# Patient Record
Sex: FEMALE | Race: WHITE | NOT HISPANIC OR LATINO | Employment: FULL TIME | URBAN - METROPOLITAN AREA
[De-identification: names, ages, dates, MRNs, and addresses within clinical notes are randomized per-mention and may not be internally consistent; named-entity substitution may affect disease eponyms.]

---

## 2020-11-16 RX ORDER — PROCHLORPERAZINE 25 MG/1
SUPPOSITORY RECTAL
Qty: 3 EACH | Refills: 1 | Status: SHIPPED | OUTPATIENT
Start: 2020-11-16 | End: 2020-12-08 | Stop reason: SDUPTHER

## 2020-11-16 RX ORDER — PROCHLORPERAZINE 25 MG/1
SUPPOSITORY RECTAL
COMMUNITY
Start: 2020-08-11 | End: 2020-11-16 | Stop reason: SDUPTHER

## 2020-11-16 RX ORDER — PROCHLORPERAZINE 25 MG/1
1 SUPPOSITORY RECTAL AS NEEDED
Qty: 1 EACH | Refills: 0 | Status: SHIPPED | OUTPATIENT
Start: 2020-11-16 | End: 2021-02-15 | Stop reason: SDUPTHER

## 2020-11-16 RX ORDER — PROCHLORPERAZINE 25 MG/1
SUPPOSITORY RECTAL
COMMUNITY
Start: 2020-10-02 | End: 2020-11-16 | Stop reason: SDUPTHER

## 2020-11-16 NOTE — TELEPHONE ENCOUNTER
PATIENT IS NEEDING REFILLS ON HER DEXCOM SENSORS AND TRANSMITTERS. SHE IS RUNNING LOW AND NEEDS REFILLS. PHARMACY IS [facility] IN [facility].

## 2020-12-08 ENCOUNTER — OFFICE VISIT (OUTPATIENT)
Dept: ENDOCRINOLOGY | Facility: CLINIC | Age: 30
End: 2020-12-08

## 2020-12-08 VITALS
DIASTOLIC BLOOD PRESSURE: 80 MMHG | TEMPERATURE: 97.1 F | WEIGHT: 226 LBS | SYSTOLIC BLOOD PRESSURE: 124 MMHG | BODY MASS INDEX: 36.32 KG/M2 | HEART RATE: 90 BPM | OXYGEN SATURATION: 99 % | HEIGHT: 66 IN

## 2020-12-08 DIAGNOSIS — IMO0002 DIABETES MELLITUS TYPE 1, UNCONTROLLED, WITH COMPLICATIONS: Primary | ICD-10-CM

## 2020-12-08 DIAGNOSIS — E11.649 DIABETIC HYPOGLYCEMIA (HCC): ICD-10-CM

## 2020-12-08 LAB
EXPIRATION DATE: NORMAL
HBA1C MFR BLD: 7.2 %
Lab: NORMAL

## 2020-12-08 PROCEDURE — 99213 OFFICE O/P EST LOW 20 MIN: CPT | Performed by: INTERNAL MEDICINE

## 2020-12-08 PROCEDURE — 83036 HEMOGLOBIN GLYCOSYLATED A1C: CPT | Performed by: INTERNAL MEDICINE

## 2020-12-08 RX ORDER — INSULIN ASPART 100 [IU]/ML
INJECTION, SOLUTION INTRAVENOUS; SUBCUTANEOUS
COMMUNITY
Start: 2020-10-28 | End: 2020-12-08 | Stop reason: SDUPTHER

## 2020-12-08 RX ORDER — INSULIN ASPART 100 [IU]/ML
INJECTION, SOLUTION INTRAVENOUS; SUBCUTANEOUS
Qty: 90 ML | Refills: 3 | Status: SHIPPED | OUTPATIENT
Start: 2020-12-08 | End: 2021-12-08 | Stop reason: CLARIF

## 2020-12-08 RX ORDER — INSULIN GLARGINE 100 [IU]/ML
INJECTION, SOLUTION SUBCUTANEOUS
COMMUNITY
Start: 2020-09-14 | End: 2020-12-08 | Stop reason: SDUPTHER

## 2020-12-08 RX ORDER — INSULIN GLARGINE 100 [IU]/ML
INJECTION, SOLUTION SUBCUTANEOUS
Qty: 36 ML | Refills: 3 | Status: SHIPPED | OUTPATIENT
Start: 2020-12-08 | End: 2021-03-01 | Stop reason: SDUPTHER

## 2020-12-08 RX ORDER — LEVONORGESTREL AND ETHINYL ESTRADIOL 0.15-0.03
KIT ORAL
COMMUNITY
End: 2022-09-29

## 2020-12-08 RX ORDER — PROCHLORPERAZINE 25 MG/1
SUPPOSITORY RECTAL
Qty: 3 EACH | Refills: 1 | Status: SHIPPED | OUTPATIENT
Start: 2020-12-08 | End: 2021-03-16 | Stop reason: SDUPTHER

## 2020-12-08 NOTE — PROGRESS NOTES
"     Office Note      Date: 2020  Patient Name: Gricelda Dong  MRN: [id number]  : 1990    Chief Complaint   Patient presents with   • Diabetes       History of Present Illness:   Gricelda Dong is a 30 y.o. female who presents for Diabetes- type 1  Duration- 28 years .  Severity- easily controlled now was not well controlled in past.  Complications- retinopathy.  Modifying factors- 4 shots of insulin daily and dexcom. She adjusts her insulin based upon her bg readings.     Last A1c:7.7   60-70 readings at night.  Hemoglobin A1C   Date Value Ref Range Status   2020 7.2 % Final       Changes in health since last visit: - had mild case of covid since last time . Last eye exam up to date.    Subjective              Review of Systems:   Review of Systems   Constitutional: Negative.    HENT: Negative.    Eyes: Negative.    Respiratory: Negative.    Cardiovascular: Negative.    Gastrointestinal: Negative.    Endocrine: Negative.    Genitourinary: Negative.    Musculoskeletal: Negative.    Skin: Negative.    Allergic/Immunologic: Negative.    Neurological: Negative.    Hematological: Negative.    Psychiatric/Behavioral: Negative.        The following portions of the patient's history were reviewed and updated as appropriate: allergies, current medications, past family history, past medical history, past social history, past surgical history and problem list.    Objective     Visit Vitals  /80 (BP Location: Left arm, Patient Position: Sitting, Cuff Size: Adult)   Pulse 90   Temp 97.1 °F (36.2 °C) (Infrared)   Ht 167.6 cm (66\")   Wt 103 kg (226 lb)   SpO2 99%   BMI 36.48 kg/m²       Labs:    CMP  No results found for: GLUCOSE, BUN, CREATININE, EGFRIFNONA, EGFRIFAFRI, BCR, K, CO2, CALCIUM, PROTENTOTREF, LABIL2, BILIRUBIN, AST, ALT     CBC w/DIFF  No results found for: WBC, RBC, HGB, HCT, MCV, MCH, MCHC, RDW, RDWSD, MPV, PLT, NEUTRORELPCT, LYMPHORELPCT, MONORELPCT, EOSRELPCT, BASORELPCT, AUTOIGPER, " NEUTROABS, LYMPHSABS, MONOSABS, EOSABS, BASOSABS, AUTOIGNUM, NRBC    Physical Exam:  Physical Exam  Vitals signs reviewed.   Constitutional:       Appearance: Normal appearance. She is obese.   Neurological:      General: No focal deficit present.      Mental Status: She is alert and oriented to person, place, and time. Mental status is at baseline.   Psychiatric:         Judgment: Judgment normal.          Assessment / Plan      Assessment & Plan:  Problem List Items Addressed This Visit        Endocrine    Diabetic hypoglycemia (CMS/HCC)    Current Assessment & Plan     Improved as it is happening < 1 % of the time. She can nudge her basaglar dose down          Relevant Medications    Insulin Glargine (BASAGLAR KWIKPEN) 100 UNIT/ML injection pen    NovoLOG FlexPen 100 UNIT/ML solution pen-injector sc pen    Diabetes mellitus type 1, uncontrolled, with complications (CMS/HCC) - Primary    Current Assessment & Plan     Improved. Stay the course          Relevant Medications    Continuous Blood Gluc Sensor (Dexcom G6 Sensor)    Insulin Glargine (BASAGLAR KWIKPEN) 100 UNIT/ML injection pen    NovoLOG FlexPen 100 UNIT/ML solution pen-injector sc pen    Other Relevant Orders    POC Glycosylated Hemoglobin (Hb A1C) (Completed)           Mario Moe MD   12/08/2020

## 2021-02-15 RX ORDER — PROCHLORPERAZINE 25 MG/1
1 SUPPOSITORY RECTAL AS NEEDED
Qty: 1 EACH | Refills: 1 | Status: SHIPPED | OUTPATIENT
Start: 2021-02-15 | End: 2021-11-11

## 2021-02-15 NOTE — TELEPHONE ENCOUNTER
PT CALLED REQUESTING A REFILL OF TRANSMITTERS TO BE SENT IN TO [facility] ON [facility] IN [place], [place]. PLEASE AND THANK YOU.

## 2021-03-01 DIAGNOSIS — IMO0002 DIABETES MELLITUS TYPE 1, UNCONTROLLED, WITH COMPLICATIONS: ICD-10-CM

## 2021-03-01 RX ORDER — INSULIN GLARGINE 100 [IU]/ML
INJECTION, SOLUTION SUBCUTANEOUS
Qty: 36 ML | Refills: 3 | Status: SHIPPED | OUTPATIENT
Start: 2021-03-01 | End: 2021-05-26 | Stop reason: CLARIF

## 2021-03-16 DIAGNOSIS — IMO0002 DIABETES MELLITUS TYPE 1, UNCONTROLLED, WITH COMPLICATIONS: ICD-10-CM

## 2021-03-16 RX ORDER — PROCHLORPERAZINE 25 MG/1
SUPPOSITORY RECTAL
Qty: 3 EACH | Refills: 5 | Status: SHIPPED | OUTPATIENT
Start: 2021-03-16 | End: 2021-09-20

## 2021-03-16 NOTE — TELEPHONE ENCOUNTER
PT CALLED REQUESTING A REFILL OF DEXCOM SENSORS TO BE SENT IN TO [facility] [place] [facility]. PLEASE AND THANK YOU

## 2021-03-18 ENCOUNTER — TELEPHONE (OUTPATIENT)
Dept: ENDOCRINOLOGY | Facility: CLINIC | Age: 31
End: 2021-03-18

## 2021-03-18 NOTE — TELEPHONE ENCOUNTER
Approvedtoday  PA Case: [id number], Status: Approved, Coverage Starts on: 3/18/2021 12:00:00 AM, Coverage Ends on: 3/18/2022 12:00:00 AM.  Drug  Dexcom G6 Sensor  Form  [employer] PA Form (2017 NCPDP)

## 2021-05-26 ENCOUNTER — TELEPHONE (OUTPATIENT)
Dept: ENDOCRINOLOGY | Facility: CLINIC | Age: 31
End: 2021-05-26

## 2021-05-26 NOTE — TELEPHONE ENCOUNTER
PATIENT STATES THAT HER BASAGLAR RX IS NO LONGER COVERED BY HER INSURANCE PLAN AND WILL NEED AN ALTERNATIVE.

## 2021-06-08 ENCOUNTER — OFFICE VISIT (OUTPATIENT)
Dept: ENDOCRINOLOGY | Facility: CLINIC | Age: 31
End: 2021-06-08

## 2021-06-08 ENCOUNTER — LAB (OUTPATIENT)
Dept: LAB | Facility: HOSPITAL | Age: 31
End: 2021-06-08

## 2021-06-08 VITALS
HEIGHT: 66 IN | SYSTOLIC BLOOD PRESSURE: 118 MMHG | OXYGEN SATURATION: 98 % | HEART RATE: 94 BPM | WEIGHT: 232.6 LBS | BODY MASS INDEX: 37.38 KG/M2 | DIASTOLIC BLOOD PRESSURE: 78 MMHG

## 2021-06-08 DIAGNOSIS — E10.65 UNCONTROLLED TYPE 1 DIABETES MELLITUS WITH HYPERGLYCEMIA (HCC): ICD-10-CM

## 2021-06-08 DIAGNOSIS — E10.65 UNCONTROLLED TYPE 1 DIABETES MELLITUS WITH HYPERGLYCEMIA (HCC): Primary | ICD-10-CM

## 2021-06-08 PROBLEM — IMO0002 DIABETES MELLITUS TYPE 1, UNCONTROLLED, WITH COMPLICATIONS: Status: RESOLVED | Noted: 2020-12-08 | Resolved: 2021-06-08

## 2021-06-08 LAB
ALBUMIN SERPL-MCNC: 4.3 G/DL (ref 3.5–5.2)
ALBUMIN UR-MCNC: 1.7 MG/DL
ALBUMIN/GLOB SERPL: 1.4 G/DL
ALP SERPL-CCNC: 106 U/L (ref 39–117)
ALT SERPL W P-5'-P-CCNC: 16 U/L (ref 1–33)
ANION GAP SERPL CALCULATED.3IONS-SCNC: 11 MMOL/L (ref 5–15)
AST SERPL-CCNC: 15 U/L (ref 1–32)
BILIRUB SERPL-MCNC: 0.4 MG/DL (ref 0–1.2)
BUN SERPL-MCNC: 11 MG/DL (ref 6–20)
BUN/CREAT SERPL: 15.9 (ref 7–25)
CALCIUM SPEC-SCNC: 9 MG/DL (ref 8.6–10.5)
CHLORIDE SERPL-SCNC: 103 MMOL/L (ref 98–107)
CHOLEST SERPL-MCNC: 200 MG/DL (ref 0–200)
CO2 SERPL-SCNC: 23 MMOL/L (ref 22–29)
CREAT SERPL-MCNC: 0.69 MG/DL (ref 0.57–1)
CREAT UR-MCNC: 157.4 MG/DL
EXPIRATION DATE: ABNORMAL
EXPIRATION DATE: NORMAL
GFR SERPL CREATININE-BSD FRML MDRD: 99 ML/MIN/1.73
GLOBULIN UR ELPH-MCNC: 3.1 GM/DL
GLUCOSE BLDC GLUCOMTR-MCNC: 201 MG/DL (ref 70–130)
GLUCOSE SERPL-MCNC: 183 MG/DL (ref 65–99)
HBA1C MFR BLD: 7 %
HDLC SERPL-MCNC: 58 MG/DL (ref 40–60)
LDLC SERPL CALC-MCNC: 125 MG/DL (ref 0–100)
LDLC/HDLC SERPL: 2.13 {RATIO}
Lab: ABNORMAL
Lab: NORMAL
MICROALBUMIN/CREAT UR: 10.8 MG/G
POTASSIUM SERPL-SCNC: 4.2 MMOL/L (ref 3.5–5.2)
PROT SERPL-MCNC: 7.4 G/DL (ref 6–8.5)
SODIUM SERPL-SCNC: 137 MMOL/L (ref 136–145)
TRIGL SERPL-MCNC: 92 MG/DL (ref 0–150)
TSH SERPL DL<=0.05 MIU/L-ACNC: 1.79 UIU/ML (ref 0.27–4.2)
VLDLC SERPL-MCNC: 17 MG/DL (ref 5–40)

## 2021-06-08 PROCEDURE — 99213 OFFICE O/P EST LOW 20 MIN: CPT | Performed by: INTERNAL MEDICINE

## 2021-06-08 PROCEDURE — 82043 UR ALBUMIN QUANTITATIVE: CPT

## 2021-06-08 PROCEDURE — 82570 ASSAY OF URINE CREATININE: CPT

## 2021-06-08 PROCEDURE — 95251 CONT GLUC MNTR ANALYSIS I&R: CPT | Performed by: INTERNAL MEDICINE

## 2021-06-08 PROCEDURE — 80061 LIPID PANEL: CPT

## 2021-06-08 PROCEDURE — 83036 HEMOGLOBIN GLYCOSYLATED A1C: CPT | Performed by: INTERNAL MEDICINE

## 2021-06-08 PROCEDURE — 80053 COMPREHEN METABOLIC PANEL: CPT

## 2021-06-08 PROCEDURE — 84443 ASSAY THYROID STIM HORMONE: CPT

## 2021-06-08 RX ORDER — SERTRALINE HYDROCHLORIDE 100 MG/1
100 TABLET, FILM COATED ORAL DAILY
COMMUNITY

## 2021-06-08 NOTE — PROGRESS NOTES
"     Office Note      Date: 2021  Patient Name: Gricelda Dong  MRN: [id number]  : 1990    Chief Complaint   Patient presents with   • Diabetes       History of Present Illness:   Gricelda Dong is a 31 y.o. female who presents for Diabetes - type 1  She takes 4 shots per day and bg is checked 288 times per day with dexcom  Insulin is adjusted frequently based upon the results  She has been using this system for the last 6 months     Last A1c:7.2  Hemoglobin A1C   Date Value Ref Range Status   2021 7.0 % Final       Changes in health since last visit: none . Last eye exam up to date- her \"bleeding has healed \"  Fasting labs are due today  Feet are good    She has no questions for me .    Subjective          Review of Systems:   Review of Systems   Constitutional: Negative.    HENT: Negative.    Eyes: Negative.    Respiratory: Negative.    All other systems reviewed and are negative.      The following portions of the patient's history were reviewed and updated as appropriate: allergies, current medications, past family history, past medical history, past social history, past surgical history and problem list.    Objective     Visit Vitals  /78   Pulse 94   Ht 167.6 cm (66\")   Wt 106 kg (232 lb 9.6 oz)   SpO2 98%   BMI 37.54 kg/m²         Physical Exam:  Physical Exam  Vitals reviewed.   Constitutional:       Appearance: Normal appearance.   Neurological:      Mental Status: She is alert and oriented to person, place, and time.   Psychiatric:         Mood and Affect: Mood normal.         Thought Content: Thought content normal.         Judgment: Judgment normal.          Assessment / Plan      Assessment & Plan:  Problem List Items Addressed This Visit        Other    Uncontrolled type 1 diabetes mellitus with hyperglycemia (CMS/[facility]) - Primary    Overview     Diagnosed age 2         Current Assessment & Plan     Diabetes is improving with treatment.   Continue current treatment " regimen.  Diabetes will be reassessed in 6 months     dexcom data reviewed  2 weeks of data  69% in range  25 % were 180-250  Minimal hypos  Pattern is flat with moderate variability  No changes to insulin are needed based upon that pattern .         Relevant Medications    NovoLOG FlexPen 100 UNIT/ML solution pen-injector sc pen    Insulin Glargine (LANTUS SOLOSTAR) 100 UNIT/ML injection pen    Other Relevant Orders    POC Glycosylated Hemoglobin (Hb A1C) (Completed)    POC Glucose, Blood (Completed)    Comprehensive Metabolic Panel    Lipid Panel    Microalbumin / Creatinine Urine Ratio - Urine, Clean Catch    TSH           Mario Moe MD   06/08/2021

## 2021-06-08 NOTE — ASSESSMENT & PLAN NOTE
Diabetes is improving with treatment.   Continue current treatment regimen.  Diabetes will be reassessed in 6 months     dexcom data reviewed  2 weeks of data  69% in range  25 % were 180-250  Minimal hypos  Pattern is flat with moderate variability  No changes to insulin are needed based upon that pattern .

## 2021-09-10 RX ORDER — PEN NEEDLE, DIABETIC 30 GX3/16"
1 NEEDLE, DISPOSABLE MISCELLANEOUS 4 TIMES DAILY
Qty: 400 EACH | Refills: 0 | Status: SHIPPED | OUTPATIENT
Start: 2021-09-10

## 2021-09-10 RX ORDER — INSULIN LISPRO 100 [IU]/ML
INJECTION, SOLUTION INTRAVENOUS; SUBCUTANEOUS
Qty: 90 ML | Refills: 0 | Status: SHIPPED | OUTPATIENT
Start: 2021-09-10 | End: 2022-06-15 | Stop reason: SDUPTHER

## 2021-09-10 NOTE — TELEPHONE ENCOUNTER
PT CALLED STATING NOVOLOG IS NO LONGER COVERED BY HER INSURANCE AND REQUESTED WE SEND IN HUMALOG PENS AND PEN NEEDLES TO [facility] ON [facility] IN [place], [place]. PLEASE AND THANK YOU

## 2021-09-18 DIAGNOSIS — IMO0002 DIABETES MELLITUS TYPE 1, UNCONTROLLED, WITH COMPLICATIONS: ICD-10-CM

## 2021-09-20 RX ORDER — PROCHLORPERAZINE 25 MG/1
SUPPOSITORY RECTAL
Qty: 3 EACH | Refills: 5 | Status: SHIPPED | OUTPATIENT
Start: 2021-09-20 | End: 2021-11-11

## 2021-11-11 NOTE — TELEPHONE ENCOUNTER
PT CALLED REQUESTING WE SEND IN A BRIANNE RX TO [facility] ON [address] IN [place], [place]. SHE STATED DEXCOM IS NO LONGER COVERED BY INSURANCE. PLEASE AND THANK YOU

## 2021-11-11 NOTE — TELEPHONE ENCOUNTER
Called and asked pt if she knew the Kelby would be covered. She stated it was step therapy. I explained she may have to pay out of pock for it. She verbalized understanding. Can we send in the Kelby?

## 2021-12-08 ENCOUNTER — OFFICE VISIT (OUTPATIENT)
Dept: ENDOCRINOLOGY | Facility: CLINIC | Age: 31
End: 2021-12-08

## 2021-12-08 VITALS
SYSTOLIC BLOOD PRESSURE: 112 MMHG | DIASTOLIC BLOOD PRESSURE: 68 MMHG | BODY MASS INDEX: 35.31 KG/M2 | OXYGEN SATURATION: 99 % | HEIGHT: 67 IN | WEIGHT: 225 LBS | HEART RATE: 102 BPM

## 2021-12-08 DIAGNOSIS — E10.65 UNCONTROLLED TYPE 1 DIABETES MELLITUS WITH HYPERGLYCEMIA (HCC): Primary | ICD-10-CM

## 2021-12-08 LAB
EXPIRATION DATE: ABNORMAL
EXPIRATION DATE: NORMAL
GLUCOSE BLDC GLUCOMTR-MCNC: 199 MG/DL (ref 70–130)
HBA1C MFR BLD: 7.3 %
Lab: ABNORMAL
Lab: NORMAL

## 2021-12-08 PROCEDURE — 82947 ASSAY GLUCOSE BLOOD QUANT: CPT | Performed by: INTERNAL MEDICINE

## 2021-12-08 PROCEDURE — 99213 OFFICE O/P EST LOW 20 MIN: CPT | Performed by: INTERNAL MEDICINE

## 2021-12-08 PROCEDURE — 83036 HEMOGLOBIN GLYCOSYLATED A1C: CPT | Performed by: INTERNAL MEDICINE

## 2021-12-08 RX ORDER — LORATADINE 10 MG/1
TABLET ORAL
COMMUNITY

## 2021-12-08 NOTE — ASSESSMENT & PLAN NOTE
Diabetes is unchanged.   Continue current treatment regimen.  Diabetes will be reassessed in 6 months.  a1c of 7.3 is acceptable   2 weeks of antonia data were reviewed.  66 % in range 29% high 5 % low  Hyperglycemia after breakfast is noted  No insulin changes needed

## 2021-12-08 NOTE — PROGRESS NOTES
"     Office Note      Date: 2021  Patient Name: Gricelda Dong  MRN: [id number]  : 1990    Chief Complaint   Patient presents with   • Diabetes     type 1       History of Present Illness:   Gricelda Dong is a 31 y.o. female who presents for Diabetes- typ1  She takes at least 4 shots per day.  She uses a antonia 2 to check her  Bg>10 times per day  She has occasional hypos     Last A1c:  Hemoglobin A1C   Date Value Ref Range Status   2021 7.3 % Final       Changes in health since last visit: none . Last eye exam due and scheduled .    Subjective              Review of Systems:   Review of Systems   Constitutional: Positive for fatigue. Negative for unexpected weight change.   Psychiatric/Behavioral: Negative for dysphoric mood and sleep disturbance. The patient is nervous/anxious.        The following portions of the patient's history were reviewed and updated as appropriate: allergies, current medications, past family history, past medical history, past social history, past surgical history and problem list.    Objective     Visit Vitals  /68   Pulse 102   Ht 170.2 cm (67\")   Wt 102 kg (225 lb)   SpO2 99%   BMI 35.24 kg/m²       Labs:    CMP  Lab Results   Component Value Date    GLUCOSE 183 (H) 2021    BUN 11 2021    CREATININE 0.69 2021    EGFRIFNONA 99 2021    BCR 15.9 2021    K 4.2 2021    CO2 23.0 2021    CALCIUM 9.0 2021    AST 15 2021    ALT 16 2021        CBC w/DIFF  No results found for: WBC, RBC, HGB, HCT, MCV, MCH, MCHC, RDW, RDWSD, MPV, PLT, NEUTRORELPCT, LYMPHORELPCT, MONORELPCT, EOSRELPCT, BASORELPCT, AUTOIGPER, NEUTROABS, LYMPHSABS, MONOSABS, EOSABS, BASOSABS, AUTOIGNUM, NRBC    Physical Exam:  Physical Exam  Vitals reviewed.   Constitutional:       Appearance: Normal appearance. She is normal weight.   Cardiovascular:      Pulses:           Dorsalis pedis pulses are 2+ on the right side and 2+ on the left side.    "     Posterior tibial pulses are 2+ on the right side and 2+ on the left side.   Musculoskeletal:      Right foot: Normal range of motion. No deformity, bunion, Charcot foot, foot drop or prominent metatarsal heads.      Left foot: Normal range of motion. No deformity, bunion, Charcot foot, foot drop or prominent metatarsal heads.   Feet:      Right foot:      Protective Sensation: 5 sites tested. 5 sites sensed.      Skin integrity: Skin integrity normal.      Toenail Condition: Right toenails are normal.      Left foot:      Protective Sensation: 5 sites tested. 5 sites sensed.      Skin integrity: Skin integrity normal.      Toenail Condition: Left toenails are normal.      Comments: Diabetic Foot Exam Performed and Monofilament Test Performed    Neurological:      Mental Status: She is alert.   Psychiatric:         Mood and Affect: Mood normal.         Thought Content: Thought content normal.         Judgment: Judgment normal.          Assessment / Plan      Assessment & Plan:  Problem List Items Addressed This Visit        Other    Uncontrolled type 1 diabetes mellitus with hyperglycemia (HCC) - Primary    Overview     Diagnosed age 2         Current Assessment & Plan     Diabetes is unchanged.   Continue current treatment regimen.  Diabetes will be reassessed in 6 months.  a1c of 7.3 is acceptable   2 weeks of antonia data were reviewed.  66 % in range 29% high 5 % low  Hyperglycemia after breakfast is noted  No insulin changes needed          Relevant Medications    Insulin Glargine (LANTUS SOLOSTAR) 100 UNIT/ML injection pen    Insulin Lispro, 1 Unit Dial, (HumaLOG KwikPen) 100 UNIT/ML solution pen-injector    Other Relevant Orders    POC Glucose, Blood (Completed)    POC Glycosylated Hemoglobin (Hb A1C) (Completed)           Mario Moe MD   12/08/2021

## 2022-06-15 ENCOUNTER — TELEPHONE (OUTPATIENT)
Dept: ENDOCRINOLOGY | Facility: CLINIC | Age: 32
End: 2022-06-15

## 2022-06-15 ENCOUNTER — OFFICE VISIT (OUTPATIENT)
Dept: ENDOCRINOLOGY | Facility: CLINIC | Age: 32
End: 2022-06-15

## 2022-06-15 ENCOUNTER — LAB (OUTPATIENT)
Dept: LAB | Facility: HOSPITAL | Age: 32
End: 2022-06-15

## 2022-06-15 ENCOUNTER — PRIOR AUTHORIZATION (OUTPATIENT)
Dept: ENDOCRINOLOGY | Facility: CLINIC | Age: 32
End: 2022-06-15

## 2022-06-15 VITALS
WEIGHT: 224 LBS | BODY MASS INDEX: 35.16 KG/M2 | SYSTOLIC BLOOD PRESSURE: 110 MMHG | DIASTOLIC BLOOD PRESSURE: 70 MMHG | HEART RATE: 78 BPM | OXYGEN SATURATION: 98 % | HEIGHT: 67 IN

## 2022-06-15 DIAGNOSIS — E10.65 UNCONTROLLED TYPE 1 DIABETES MELLITUS WITH HYPERGLYCEMIA: Primary | ICD-10-CM

## 2022-06-15 DIAGNOSIS — E10.65 UNCONTROLLED TYPE 1 DIABETES MELLITUS WITH HYPERGLYCEMIA: ICD-10-CM

## 2022-06-15 LAB
ALBUMIN SERPL-MCNC: 4.4 G/DL (ref 3.5–5.2)
ALBUMIN UR-MCNC: 5.3 MG/DL
ALBUMIN/GLOB SERPL: 1.7 G/DL
ALP SERPL-CCNC: 79 U/L (ref 39–117)
ALT SERPL W P-5'-P-CCNC: 14 U/L (ref 1–33)
ANION GAP SERPL CALCULATED.3IONS-SCNC: 14.5 MMOL/L (ref 5–15)
AST SERPL-CCNC: 14 U/L (ref 1–32)
BILIRUB SERPL-MCNC: 0.3 MG/DL (ref 0–1.2)
BUN SERPL-MCNC: 9 MG/DL (ref 6–20)
BUN/CREAT SERPL: 13.8 (ref 7–25)
CALCIUM SPEC-SCNC: 9.2 MG/DL (ref 8.6–10.5)
CHLORIDE SERPL-SCNC: 101 MMOL/L (ref 98–107)
CHOLEST SERPL-MCNC: 182 MG/DL (ref 0–200)
CO2 SERPL-SCNC: 20.5 MMOL/L (ref 22–29)
CREAT SERPL-MCNC: 0.65 MG/DL (ref 0.57–1)
CREAT UR-MCNC: 75.3 MG/DL
EGFRCR SERPLBLD CKD-EPI 2021: 120.1 ML/MIN/1.73
EXPIRATION DATE: ABNORMAL
EXPIRATION DATE: NORMAL
GLOBULIN UR ELPH-MCNC: 2.6 GM/DL
GLUCOSE BLDC GLUCOMTR-MCNC: 140 MG/DL (ref 70–130)
GLUCOSE SERPL-MCNC: 118 MG/DL (ref 65–99)
HBA1C MFR BLD: 7.4 %
HDLC SERPL-MCNC: 55 MG/DL (ref 40–60)
LDLC SERPL CALC-MCNC: 91 MG/DL (ref 0–100)
LDLC/HDLC SERPL: 1.52 {RATIO}
Lab: ABNORMAL
Lab: NORMAL
MICROALBUMIN/CREAT UR: 70.4 MG/G
POTASSIUM SERPL-SCNC: 4 MMOL/L (ref 3.5–5.2)
PROT SERPL-MCNC: 7 G/DL (ref 6–8.5)
SODIUM SERPL-SCNC: 136 MMOL/L (ref 136–145)
TRIGL SERPL-MCNC: 216 MG/DL (ref 0–150)
TSH SERPL DL<=0.05 MIU/L-ACNC: 2.54 UIU/ML (ref 0.27–4.2)
VLDLC SERPL-MCNC: 36 MG/DL (ref 5–40)

## 2022-06-15 PROCEDURE — 82043 UR ALBUMIN QUANTITATIVE: CPT

## 2022-06-15 PROCEDURE — 99213 OFFICE O/P EST LOW 20 MIN: CPT | Performed by: INTERNAL MEDICINE

## 2022-06-15 PROCEDURE — 84443 ASSAY THYROID STIM HORMONE: CPT

## 2022-06-15 PROCEDURE — 82947 ASSAY GLUCOSE BLOOD QUANT: CPT | Performed by: INTERNAL MEDICINE

## 2022-06-15 PROCEDURE — 80061 LIPID PANEL: CPT

## 2022-06-15 PROCEDURE — 80053 COMPREHEN METABOLIC PANEL: CPT

## 2022-06-15 PROCEDURE — 82570 ASSAY OF URINE CREATININE: CPT

## 2022-06-15 PROCEDURE — 83036 HEMOGLOBIN GLYCOSYLATED A1C: CPT | Performed by: INTERNAL MEDICINE

## 2022-06-15 RX ORDER — PROCHLORPERAZINE 25 MG/1
SUPPOSITORY RECTAL
Qty: 9 EACH | Refills: 3 | Status: SHIPPED | OUTPATIENT
Start: 2022-06-15

## 2022-06-15 RX ORDER — PROCHLORPERAZINE 25 MG/1
1 SUPPOSITORY RECTAL
Qty: 1 EACH | Refills: 3 | Status: SHIPPED | OUTPATIENT
Start: 2022-06-15

## 2022-06-15 RX ORDER — INSULIN PMP CART,AUT,G6/7,CNTR
1 EACH SUBCUTANEOUS
Qty: 10 KIT | Refills: 0 | Status: SHIPPED | OUTPATIENT
Start: 2022-06-15 | End: 2022-08-09

## 2022-06-15 RX ORDER — PROCHLORPERAZINE 25 MG/1
1 SUPPOSITORY RECTAL
Qty: 1 EACH | Refills: 0 | Status: SHIPPED | OUTPATIENT
Start: 2022-06-15

## 2022-06-15 RX ORDER — INSULIN LISPRO 100 [IU]/ML
INJECTION, SOLUTION INTRAVENOUS; SUBCUTANEOUS
Qty: 90 ML | Refills: 0 | Status: SHIPPED | OUTPATIENT
Start: 2022-06-15

## 2022-06-15 NOTE — PROGRESS NOTES
"     Office Note      Date: 06/15/2022  Patient Name: Gricelda Dong  MRN: [id number]  : 1990    Chief Complaint   Patient presents with   • Diabetes       History of Present Illness:   Gricelda Dong is a 32 y.o. female who presents for Diabetes - type 1  She is on 4 shots per day and has been for years  She uses a antonia to check bg >10 times per day she has occasional hypos  She adjusts her insulin dose based upon her readings      Last A1c:7.3  Hemoglobin A1C   Date Value Ref Range Status   06/15/2022 7.4 % Final       Changes in health since last visit: none. Last eye exam up to date.    Subjective              Review of Systems:   Review of Systems   Constitutional: Negative.    HENT: Negative.    Eyes: Negative.        The following portions of the patient's history were reviewed and updated as appropriate: allergies, current medications, past family history, past medical history, past social history, past surgical history and problem list.    Objective     Visit Vitals  /70   Pulse 78   Ht 170.2 cm (67\")   Wt 102 kg (224 lb)   SpO2 98%   BMI 35.08 kg/m²       Labs:    CMP  Lab Results   Component Value Date    GLUCOSE 183 (H) 2021    BUN 11 2021    CREATININE 0.69 2021    EGFRIFNONA 99 2021    BCR 15.9 2021    K 4.2 2021    CO2 23.0 2021    CALCIUM 9.0 2021    AST 15 2021    ALT 16 2021        CBC w/DIFF  No results found for: WBC, RBC, HGB, HCT, MCV, MCH, MCHC, RDW, RDWSD, MPV, PLT, NEUTRORELPCT, LYMPHORELPCT, MONORELPCT, EOSRELPCT, BASORELPCT, AUTOIGPER, NEUTROABS, LYMPHSABS, MONOSABS, EOSABS, BASOSABS, AUTOIGNUM, NRBC    Physical Exam:  Physical Exam  Vitals reviewed.   Constitutional:       Appearance: Normal appearance.   Neurological:      Mental Status: She is alert.   Psychiatric:         Behavior: Behavior normal.         Thought Content: Thought content normal.         Judgment: Judgment normal.          Assessment / Plan  "     Assessment & Plan:  Problem List Items Addressed This Visit        Other    Uncontrolled type 1 diabetes mellitus with hyperglycemia (HCC) - Primary    Overview     Diagnosed age 2           Current Assessment & Plan     Diabetes is unchanged.   start process to get omnipod 5   Diabetes will be reassessed in 3 months.           Relevant Medications    Insulin Glargine (LANTUS SOLOSTAR) 100 UNIT/ML injection pen    Insulin Lispro, 1 Unit Dial, (HumaLOG KwikPen) 100 UNIT/ML solution pen-injector    Other Relevant Orders    POC Glucose, Blood (Completed)    POC Glycosylated Hemoglobin (Hb A1C) (Completed)    Comprehensive Metabolic Panel    Lipid Panel    Microalbumin / Creatinine Urine Ratio - Urine, Clean Catch    TSH           Mario Moe MD   06/15/2022

## 2022-06-15 NOTE — TELEPHONE ENCOUNTER
Approvedtoday  PA Case: [id number], Status: Approved, Coverage Starts on: 6/15/2022 12:00:00 AM, Coverage Ends on: 6/15/2023 12:00:00 AM.  Drug  Dexcom G6  device  Form  [employer] PA Form (2017 NCPDP)    Additional Information Required  Available without authorization.  Drug  Dexcom G6 Sensor  Form  [employer] PA Form (2017 NCPDP)

## 2022-06-15 NOTE — ASSESSMENT & PLAN NOTE
Diabetes is unchanged.   start process to get omnipod 5   Diabetes will be reassessed in 3 months.

## 2022-06-16 ENCOUNTER — PRIOR AUTHORIZATION (OUTPATIENT)
Dept: ENDOCRINOLOGY | Facility: CLINIC | Age: 32
End: 2022-06-16

## 2022-06-16 NOTE — TELEPHONE ENCOUNTER
Approvedtoday  PA Case: [id number], Status: Approved, Coverage Starts on: 6/16/2022 12:00:00 AM, Coverage Ends on: 7/16/2022 12:00:00 AM.  Drug  Omnipod 5 G6 Intro (Gen 5) kit  Form  [employer] Electronic PA Form (2017 NCPDP)

## 2022-06-21 ENCOUNTER — PRIOR AUTHORIZATION (OUTPATIENT)
Dept: ENDOCRINOLOGY | Facility: CLINIC | Age: 32
End: 2022-06-21

## 2022-06-21 NOTE — TELEPHONE ENCOUNTER
Gricelda Dong Key: [id number] - PA Case ID: [id number] - Rx #: [id number] help? Call us at [phone]  Outcome  Approvedtoday  PA Case: [id number], Status: Approved, Coverage Starts on: 6/21/2022 12:00:00 AM, Coverage Ends on: 6/21/2023 12:00:00 AM.  Drug  Omnipod 5 G6 Pod (Gen 5)  Form  [employer] Electronic PA Form (2017 NCPDP)

## 2022-06-29 ENCOUNTER — DOCUMENTATION (OUTPATIENT)
Dept: DIABETES SERVICES | Facility: HOSPITAL | Age: 32
End: 2022-06-29

## 2022-06-30 ENCOUNTER — DOCUMENTATION (OUTPATIENT)
Dept: DIABETES SERVICES | Facility: HOSPITAL | Age: 32
End: 2022-06-30

## 2022-06-30 NOTE — PLAN OF CARE
Reached out to patient as she contacted this office for training on her Omnipoed 5. Process started and explained to patient what is required and address her specific questions and needs. She will be scheduled in the very near future for training. She verifies that she has all needed supplies with exception of vial of humalog and this will be passed on to her provider. She requests training on any Tuesday.

## 2022-07-01 DIAGNOSIS — E10.65 UNCONTROLLED TYPE 1 DIABETES MELLITUS WITH HYPERGLYCEMIA: Primary | ICD-10-CM

## 2022-07-12 ENCOUNTER — OFFICE VISIT (OUTPATIENT)
Dept: DIABETES SERVICES | Facility: HOSPITAL | Age: 32
End: 2022-07-12

## 2022-07-12 NOTE — PLAN OF CARE
Pt attended a 70 minute Omnipod 5 in-person training on 7/12/22. Training completed per provider orders. Patient placed first pod with assistance and auto mode functioning prior to leaving clinic. Please see media tab for complete training documents.

## 2022-08-09 RX ORDER — INSULIN PMP CART,AUT,G6/7,CNTR
1 EACH SUBCUTANEOUS
Qty: 30 EACH | Refills: 3 | Status: SHIPPED | OUTPATIENT
Start: 2022-08-09 | End: 2022-08-18

## 2022-08-18 RX ORDER — INSULIN PMP CART,AUT,G6/7,CNTR
EACH SUBCUTANEOUS
Qty: 5 EACH | Refills: 5 | Status: SHIPPED | OUTPATIENT
Start: 2022-08-18 | End: 2022-09-29 | Stop reason: SDUPTHER

## 2022-09-29 ENCOUNTER — OFFICE VISIT (OUTPATIENT)
Dept: ENDOCRINOLOGY | Facility: CLINIC | Age: 32
End: 2022-09-29

## 2022-09-29 VITALS
SYSTOLIC BLOOD PRESSURE: 140 MMHG | HEIGHT: 67 IN | OXYGEN SATURATION: 98 % | DIASTOLIC BLOOD PRESSURE: 90 MMHG | WEIGHT: 221 LBS | BODY MASS INDEX: 34.69 KG/M2 | HEART RATE: 90 BPM

## 2022-09-29 DIAGNOSIS — E10.65 UNCONTROLLED TYPE 1 DIABETES MELLITUS WITH HYPERGLYCEMIA: Primary | ICD-10-CM

## 2022-09-29 LAB
EXPIRATION DATE: ABNORMAL
EXPIRATION DATE: NORMAL
GLUCOSE BLDC GLUCOMTR-MCNC: 131 MG/DL (ref 70–130)
HBA1C MFR BLD: 6.4 %
Lab: ABNORMAL
Lab: NORMAL

## 2022-09-29 PROCEDURE — 82947 ASSAY GLUCOSE BLOOD QUANT: CPT | Performed by: INTERNAL MEDICINE

## 2022-09-29 PROCEDURE — 83036 HEMOGLOBIN GLYCOSYLATED A1C: CPT | Performed by: INTERNAL MEDICINE

## 2022-09-29 PROCEDURE — 99213 OFFICE O/P EST LOW 20 MIN: CPT | Performed by: INTERNAL MEDICINE

## 2022-09-29 RX ORDER — INSULIN PMP CART,AUT,G6/7,CNTR
1 EACH SUBCUTANEOUS
Qty: 10 EACH | Refills: 5 | Status: SHIPPED | OUTPATIENT
Start: 2022-09-29

## 2022-09-29 RX ORDER — DICYCLOMINE HYDROCHLORIDE 10 MG/1
CAPSULE ORAL
COMMUNITY
Start: 2022-09-22

## 2022-09-29 NOTE — PROGRESS NOTES
"     Office Note      Date: 2022  Patient Name: Gricelda Dong  MRN: [id number]  : 1990    Chief Complaint   Patient presents with   • Diabetes       History of Present Illness:   Gricelda Dong is a 32 y.o. female who presents for Diabetes - type 1  Rx: insulin . Uses an omnipod 5 with dexcom.  BUT her pharmacy only gives her pods for half the month, then she has to go to shots  When she uses the system her numbers are stable without hypoglycemia  When she has to use shots she is unstable and has hypoglycemia     Last A1c:  Hemoglobin A1C   Date Value Ref Range Status   2022 6.4 % Final       Changes in health since last visit: none . Last eye exam up to date.    Subjective          Review of Systems:   Review of Systems   Constitutional: Negative.    HENT: Negative.    Eyes: Negative.    Respiratory: Negative.        The following portions of the patient's history were reviewed and updated as appropriate: allergies, current medications, past family history, past medical history, past social history, past surgical history and problem list.    Objective     Visit Vitals  /90   Pulse 90   Ht 170.2 cm (67\")   Wt 100 kg (221 lb)   SpO2 98%   BMI 34.61 kg/m²       Labs:    CMP  Lab Results   Component Value Date    GLUCOSE 118 (H) 06/15/2022    BUN 9 06/15/2022    CREATININE 0.65 06/15/2022    EGFRIFNONA 99 2021    BCR 13.8 06/15/2022    K 4.0 06/15/2022    CO2 20.5 (L) 06/15/2022    CALCIUM 9.2 06/15/2022    AST 14 06/15/2022    ALT 14 06/15/2022        CBC w/DIFF  No results found for: WBC, RBC, HGB, HCT, MCV, MCH, MCHC, RDW, RDWSD, MPV, PLT, NEUTRORELPCT, LYMPHORELPCT, MONORELPCT, EOSRELPCT, BASORELPCT, AUTOIGPER, NEUTROABS, LYMPHSABS, MONOSABS, EOSABS, BASOSABS, AUTOIGNUM, NRBC    Physical Exam:  Physical Exam  Vitals reviewed.   Constitutional:       Appearance: Normal appearance.   Neurological:      Mental Status: She is alert.   Psychiatric:         Mood and Affect: Mood normal. "         Behavior: Behavior normal.         Thought Content: Thought content normal.         Judgment: Judgment normal.          Assessment / Plan      Assessment & Plan:  Problem List Items Addressed This Visit        Other    Uncontrolled type 1 diabetes mellitus with hyperglycemia (HCC) - Primary    Overview     Diagnosed age 2         Current Assessment & Plan     Improved.  We need to make sure she has a ready supply of pods          Relevant Medications    Insulin Glargine (LANTUS SOLOSTAR) 100 UNIT/ML injection pen    Insulin Lispro, 1 Unit Dial, (HumaLOG KwikPen) 100 UNIT/ML solution pen-injector    insulin lispro (HumaLOG) 100 UNIT/ML injection    Other Relevant Orders    POC Glucose, Blood (Completed)    POC Glycosylated Hemoglobin (Hb A1C) (Completed)           Mario Moe MD   09/29/2022

## 2023-03-30 ENCOUNTER — OFFICE VISIT (OUTPATIENT)
Dept: ENDOCRINOLOGY | Facility: CLINIC | Age: 33
End: 2023-03-30
Payer: COMMERCIAL

## 2023-03-30 VITALS
HEART RATE: 75 BPM | HEIGHT: 67 IN | DIASTOLIC BLOOD PRESSURE: 84 MMHG | SYSTOLIC BLOOD PRESSURE: 134 MMHG | WEIGHT: 226 LBS | BODY MASS INDEX: 35.47 KG/M2 | OXYGEN SATURATION: 99 %

## 2023-03-30 DIAGNOSIS — E10.65 UNCONTROLLED TYPE 1 DIABETES MELLITUS WITH HYPERGLYCEMIA: Primary | ICD-10-CM

## 2023-03-30 LAB
EXPIRATION DATE: ABNORMAL
EXPIRATION DATE: NORMAL
GLUCOSE BLDC GLUCOMTR-MCNC: 134 MG/DL (ref 70–130)
HBA1C MFR BLD: 6.8 %
Lab: ABNORMAL
Lab: NORMAL

## 2023-03-30 PROCEDURE — 99213 OFFICE O/P EST LOW 20 MIN: CPT | Performed by: INTERNAL MEDICINE

## 2023-03-30 PROCEDURE — 82947 ASSAY GLUCOSE BLOOD QUANT: CPT | Performed by: INTERNAL MEDICINE

## 2023-03-30 PROCEDURE — 83036 HEMOGLOBIN GLYCOSYLATED A1C: CPT | Performed by: INTERNAL MEDICINE

## 2023-03-30 RX ORDER — ATORVASTATIN CALCIUM 10 MG/1
10 TABLET, FILM COATED ORAL NIGHTLY
COMMUNITY

## 2023-03-30 RX ORDER — BUSPIRONE HYDROCHLORIDE 5 MG/1
5 TABLET ORAL 2 TIMES DAILY
COMMUNITY
Start: 2023-03-23

## 2023-03-30 RX ORDER — GLUCAGON 3 MG/1
3 POWDER NASAL DAILY PRN
Qty: 1 EACH | Refills: 5 | Status: SHIPPED | OUTPATIENT
Start: 2023-03-30

## 2023-03-30 NOTE — ASSESSMENT & PLAN NOTE
Improved as it is happening < 1 % of the time. She can nudge her basaglar dose down   
Improved. Stay the course   
TELE/STEPDOWN

## 2023-03-30 NOTE — PROGRESS NOTES
"     Office Note      Date: 2023  Patient Name: Gricelda Suggs  MRN: [id number]  : 1990    Chief Complaint   Patient presents with   • Diabetes       History of Present Illness:   Gricelda Suggs is a 33 y.o. female who presents for Diabetes type 1.   Current RX insulin in a op5 pump system  With Bg is checked 288 times per day with dexcom.  Insulin doses are adjusted frequently based upon the readings.  Patient has occasional hypoglycemia.  Patient has been using the system during the last 3 months.          Last A1c:  Hemoglobin A1C   Date Value Ref Range Status   2023 6.8 % Final       Changes in health since last visit: none . Last eye exam up to date  Fasting labx are up to date  Is due for a foot check today.    Subjective              Review of Systems:   Review of Systems   Constitutional: Negative.    HENT: Negative.    Eyes: Negative.    Respiratory: Negative.        The following portions of the patient's history were reviewed and updated as appropriate: allergies, current medications, past family history, past medical history, past social history, past surgical history and problem list.    Objective     Visit Vitals  /84   Pulse 75   Ht 170.2 cm (67\")   Wt 103 kg (226 lb)   SpO2 99%   BMI 35.40 kg/m²           Physical Exam:  Physical Exam  Vitals reviewed.   Constitutional:       Appearance: Normal appearance. She is normal weight.   Cardiovascular:      Pulses:           Dorsalis pedis pulses are 2+ on the right side and 2+ on the left side.        Posterior tibial pulses are 2+ on the right side and 2+ on the left side.   Musculoskeletal:      Right foot: Normal range of motion. No deformity, bunion, Charcot foot, foot drop or prominent metatarsal heads.      Left foot: Normal range of motion. No deformity, bunion, Charcot foot, foot drop or prominent metatarsal heads.   Feet:      Right foot:      Protective Sensation: 10 sites tested. 10 sites sensed.      Skin integrity: " Skin integrity normal.      Toenail Condition: Right toenails are normal.      Left foot:      Protective Sensation: 10 sites tested. 10 sites sensed.      Skin integrity: Skin integrity normal.      Toenail Condition: Left toenails are normal.      Comments: Diabetic Foot Exam Performed and Monofilament Test Performed    Neurological:      Mental Status: She is alert.   Psychiatric:         Mood and Affect: Mood normal.         Behavior: Behavior normal.         Thought Content: Thought content normal.         Judgment: Judgment normal.          Assessment / Plan      Assessment & Plan:  Problem List Items Addressed This Visit        Other    Uncontrolled type 1 diabetes mellitus with hyperglycemia (HCC) - Primary    Overview     Diagnosed age 2         Current Assessment & Plan     Diabetes is unchanged.   Continue current treatment regimen.  Diabetes will be reassessed in 6 months.         Relevant Medications    Insulin Lispro, 1 Unit Dial, (HumaLOG KwikPen) 100 UNIT/ML solution pen-injector    insulin lispro (HumaLOG) 100 UNIT/ML injection    Glucagon (Baqsimi One Pack) 3 MG/DOSE powder    Other Relevant Orders    POC Glucose, Blood (Completed)    POC Glycosylated Hemoglobin (Hb A1C) (Completed)        Mario Moe MD   03/30/2023

## 2023-05-25 RX ORDER — PROCHLORPERAZINE 25 MG/1
SUPPOSITORY RECTAL
Qty: 9 EACH | Refills: 3 | Status: SHIPPED | OUTPATIENT
Start: 2023-05-25

## 2023-05-25 NOTE — TELEPHONE ENCOUNTER
Caller: Gricelda Suggs    Relationship: Self    Best call back number: [phone]    Requested Prescriptions:   [facility] [id number] - [place] [facility] - [phone]  - [phone]   [phone]           Requested Prescriptions     Pending Prescriptions Disp Refills   • Continuous Blood Gluc Sensor (Dexcom G6 Sensor) 9 each 3     Sig: Every 10 (Ten) Days.        Pharmacy where request should be sent:      Last office visit with prescribing clinician: 3/30/2023   Next office visit with prescribing clinician: 10/2/2023     PT SAID THEY RAN OUT YESTERDAY     Does the patient have less than a 3 day supply:  [x] Yes  [] No    Would you like a call back once the refill request has been completed: [] Yes [x] No      Mynor Covarrubias Rep   05/25/23 16:23 EDT

## 2023-05-26 RX ORDER — PROCHLORPERAZINE 25 MG/1
SUPPOSITORY RECTAL
Qty: 3 EACH | OUTPATIENT
Start: 2023-05-26

## 2023-08-24 ENCOUNTER — PRIOR AUTHORIZATION (OUTPATIENT)
Dept: ENDOCRINOLOGY | Facility: CLINIC | Age: 33
End: 2023-08-24
Payer: COMMERCIAL

## 2023-08-24 ENCOUNTER — TELEPHONE (OUTPATIENT)
Dept: ENDOCRINOLOGY | Facility: CLINIC | Age: 33
End: 2023-08-24

## 2023-08-24 NOTE — TELEPHONE ENCOUNTER
SERGIO BREWSTER Key: [id number] - PA Case ID: [id number] help? Call us at [phone]  Outcome  Approvedtoday  PA Case: [id number], Status: Approved, Coverage Starts on: 8/24/2023 12:00:00 AM, Coverage Ends on: 8/23/2024 12:00:00 AM.  Drug  Omnipod 5 G6 Pod (Gen 5)  Form  [employer] Electronic PA Form (2017 NCPDP

## 2023-08-24 NOTE — TELEPHONE ENCOUNTER
PT CALLED TO CONFIRM HER INSURANCE INFO WITH [employer]. HER INSURANCE IS CORRECT IN OUR SYSTEM (MAGALY COMING UP AS ACTIVE), HOWEVER THE CLAIMS MAILING ADDRESS IS INCORRECT.     CORRECT ADDRESS:  [address]   [place], [place] [address]

## 2023-10-02 ENCOUNTER — OFFICE VISIT (OUTPATIENT)
Dept: ENDOCRINOLOGY | Facility: CLINIC | Age: 33
End: 2023-10-02
Payer: COMMERCIAL

## 2023-10-02 VITALS
SYSTOLIC BLOOD PRESSURE: 122 MMHG | HEART RATE: 84 BPM | DIASTOLIC BLOOD PRESSURE: 60 MMHG | HEIGHT: 67 IN | BODY MASS INDEX: 36.88 KG/M2 | WEIGHT: 235 LBS

## 2023-10-02 DIAGNOSIS — E10.65 UNCONTROLLED TYPE 1 DIABETES MELLITUS WITH HYPERGLYCEMIA: Primary | ICD-10-CM

## 2023-10-02 LAB
ALBUMIN UR-MCNC: 2 MG/DL
CREAT UR-MCNC: 120.4 MG/DL
EXPIRATION DATE: ABNORMAL
EXPIRATION DATE: NORMAL
GLUCOSE BLDC GLUCOMTR-MCNC: 144 MG/DL (ref 70–130)
HBA1C MFR BLD: 6.8 %
Lab: ABNORMAL
Lab: NORMAL
MICROALBUMIN/CREAT UR: 16.6 MG/G

## 2023-10-02 PROCEDURE — 99213 OFFICE O/P EST LOW 20 MIN: CPT | Performed by: INTERNAL MEDICINE

## 2023-10-02 PROCEDURE — 82043 UR ALBUMIN QUANTITATIVE: CPT | Performed by: INTERNAL MEDICINE

## 2023-10-02 PROCEDURE — 83036 HEMOGLOBIN GLYCOSYLATED A1C: CPT | Performed by: INTERNAL MEDICINE

## 2023-10-02 PROCEDURE — 82570 ASSAY OF URINE CREATININE: CPT | Performed by: INTERNAL MEDICINE

## 2023-10-02 PROCEDURE — 95251 CONT GLUC MNTR ANALYSIS I&R: CPT | Performed by: INTERNAL MEDICINE

## 2023-10-02 RX ORDER — LOSARTAN POTASSIUM 50 MG/1
50 TABLET ORAL DAILY
COMMUNITY

## 2023-10-02 NOTE — PROGRESS NOTES
"     Office Note      Date: 10/02/2023  Patient Name: Gricelda Suggs  MRN: [id number]  : 1990    Chief Complaint   Patient presents with    Diabetes     Patient is here for routine follow up Type 1 Diabetes.   Omnipod pump has  been downloaded for review.   She complains of BLE tingling.        History of Present Illness:   Gricelda Suggs is a 33 y.o. female who presents for Diabetes type 1.   Current RX insulin in an op 5    Bg is checked 288 times per day with dexcom.  Insulin doses are adjusted frequently based upon the readings.  Patient has occasional hypoglycemia.  Patient has been using the system during the last 3 months.   The last 2 weeks of dexcom data are reviewed.  0 % are low  63 % are in range  26 % are 180-250  11 % are >250  The glycemic pattern shows:  post prandial hyperglycemia       Last A1c:  Hemoglobin A1C   Date Value Ref Range Status   10/02/2023 6.8 % Final       Changes in health since last visit: none . Last eye exam up to date .    Subjective            Review of Systems:   Review of Systems   Constitutional: Negative.    HENT: Negative.     Eyes: Negative.    Respiratory: Negative.       The following portions of the patient's history were reviewed and updated as appropriate: allergies, current medications, past family history, past medical history, past social history, past surgical history, and problem list.    Objective     Visit Vitals  /60   Pulse 84   Ht 170.2 cm (67.01\")   Wt 107 kg (235 lb)   BMI 36.80 kg/m²           Physical Exam:  Physical Exam  Vitals reviewed.   Constitutional:       Appearance: Normal appearance.   Neurological:      Mental Status: She is alert.   Psychiatric:         Mood and Affect: Mood normal.         Behavior: Behavior normal.         Thought Content: Thought content normal.         Judgment: Judgment normal.        Assessment / Plan      Assessment & Plan:  Problem List Items Addressed This Visit          Other    Uncontrolled type 1 " diabetes mellitus with hyperglycemia - Primary    Overview     Diagnosed age 2         Current Assessment & Plan      Unchanged.  Based upon the dexcom data . I changed the correction threshold to 110           Relevant Medications    insulin lispro (HumaLOG) 100 UNIT/ML injection    Glucagon (Baqsimi One Pack) 3 MG/DOSE powder    Other Relevant Orders    POC Glycosylated Hemoglobin (Hb A1C) (Completed)    POC Glucose, Blood (Completed)    Microalbumin / Creatinine Urine Ratio - Urine, Clean Catch        Mario Moe MD   10/02/2023

## 2023-11-09 RX ORDER — INSULIN PMP CART,AUT,G6/7,CNTR
EACH SUBCUTANEOUS
Qty: 30 EACH | Refills: 2 | Status: SHIPPED | OUTPATIENT
Start: 2023-11-09

## 2023-11-09 NOTE — TELEPHONE ENCOUNTER
Rx Refill Note  Requested Prescriptions     Pending Prescriptions Disp Refills    Insulin Disposable Pump (Omnipod 5 G6 Pod, Gen 5,) misc [Pharmacy Med Name: OMNIPOD 5 G6 REFILL PODS 5PK (GEN5)] 30 each      Sig: CHANGE POD EVERY 3 DAYS          Last office visit with prescribing clinician: 10/2/2023     Next office visit with prescribing clinician: 4/3/2024         Keyonna Parker MA  11/09/23, 13:41 EST

## 2024-02-28 ENCOUNTER — TELEPHONE (OUTPATIENT)
Dept: ENDOCRINOLOGY | Facility: CLINIC | Age: 34
End: 2024-02-28
Payer: COMMERCIAL

## 2024-02-28 NOTE — TELEPHONE ENCOUNTER
Spoke with patient.  She states she wants to see how much cost of Tandem is and requested paperwork be submitted.  Placed in Dr. Moe box for signature.

## 2024-02-28 NOTE — TELEPHONE ENCOUNTER
PT CALLED STATING SHE WAS RETURNING OUR CALL ABOUT GETTING A TANDEM PUMP. SHE REQUESTED WE REACH BACK OUT TO HER.

## 2024-04-03 ENCOUNTER — OFFICE VISIT (OUTPATIENT)
Dept: ENDOCRINOLOGY | Facility: CLINIC | Age: 34
End: 2024-04-03
Payer: COMMERCIAL

## 2024-04-03 VITALS
SYSTOLIC BLOOD PRESSURE: 124 MMHG | OXYGEN SATURATION: 97 % | HEART RATE: 89 BPM | HEIGHT: 67 IN | BODY MASS INDEX: 38.17 KG/M2 | DIASTOLIC BLOOD PRESSURE: 86 MMHG | WEIGHT: 243.2 LBS

## 2024-04-03 DIAGNOSIS — E10.65 UNCONTROLLED TYPE 1 DIABETES MELLITUS WITH HYPERGLYCEMIA: Primary | ICD-10-CM

## 2024-04-03 LAB
EXPIRATION DATE: ABNORMAL
GLUCOSE BLDC GLUCOMTR-MCNC: 193 MG/DL (ref 70–130)
HBA1C MFR BLD: 6.4 % (ref 4.5–5.7)
Lab: ABNORMAL

## 2024-04-03 RX ORDER — INSULIN LISPRO 100 [IU]/ML
INJECTION, SUSPENSION SUBCUTANEOUS
COMMUNITY
End: 2024-04-03

## 2024-04-03 RX ORDER — INSULIN LISPRO 100 [IU]/ML
INJECTION, SOLUTION INTRAVENOUS; SUBCUTANEOUS
Qty: 30 ML | Refills: 12 | Status: SHIPPED | OUTPATIENT
Start: 2024-04-03

## 2024-04-03 NOTE — ASSESSMENT & PLAN NOTE
Improved.  No changes needed  To fix prandial hyperglycemia she can adjust her diet or change her  carb ratio

## 2024-04-03 NOTE — PROGRESS NOTES
"     Office Note      Date: 2024  Patient Name: Gricelda Suggs  MRN: [id number]  : 1990    Chief Complaint   Patient presents with    Diabetes     She gets higher readings in the morning around 200-250       History of Present Illness:   Gricelda Suggs is a 34 y.o. female who presents for Diabetes type 1.   Current RX insulin in a op5  Bg is checked 288 times per day with dexcom.  Insulin doses are adjusted frequently based upon the readings.  Patient has occasional hypoglycemia.  Patient has been using the system during the last 3 months.   The last 2 weeks of dexcom data are reviewed.  0 % are low  68 % are in range  27 % are 180-250  5 % are >250  The glycemic pattern shows:  post prandial hyperglycemia           Last A1c:  Hemoglobin A1C   Date Value Ref Range Status   2024 6.4 (A) 4.5 - 5.7 % Final       Changes in health since last visit: none . Last eye exam up to date. Things improved so now goes annually .    Subjective              Review of Systems:   Review of Systems   Endocrine: Negative for polydipsia and polyuria.       The following portions of the patient's history were reviewed and updated as appropriate: allergies, current medications, past family history, past medical history, past social history, past surgical history, and problem list.    Objective     Visit Vitals  /86 (BP Location: Left arm, Patient Position: Sitting, Cuff Size: Adult)   Pulse 89   Ht 170.2 cm (67.01\")   Wt 110 kg (243 lb 3.2 oz)   SpO2 97%   BMI 38.08 kg/m²           Physical Exam:  Physical Exam  Vitals reviewed.   Constitutional:       Appearance: Normal appearance. She is normal weight.   Cardiovascular:      Pulses:           Dorsalis pedis pulses are 2+ on the right side and 2+ on the left side.        Posterior tibial pulses are 2+ on the right side and 2+ on the left side.   Musculoskeletal:      Right foot: Normal range of motion. No deformity, bunion, Charcot foot, foot drop or prominent " metatarsal heads.      Left foot: Normal range of motion. No deformity, bunion, Charcot foot, foot drop or prominent metatarsal heads.   Feet:      Right foot:      Protective Sensation: 10 sites tested.  10 sites sensed.      Skin integrity: Skin integrity normal.      Toenail Condition: Right toenails are normal.      Left foot:      Protective Sensation: 10 sites tested.  10 sites sensed.      Skin integrity: Skin integrity normal.      Toenail Condition: Left toenails are normal.      Comments: Diabetic Foot Exam Performed    Neurological:      Mental Status: She is alert.   Psychiatric:         Mood and Affect: Mood normal.         Behavior: Behavior normal.         Thought Content: Thought content normal.         Judgment: Judgment normal.          Assessment / Plan      Assessment & Plan:  Problem List Items Addressed This Visit          Other    Uncontrolled type 1 diabetes mellitus with hyperglycemia - Primary    Overview     Diagnosed age 2         Current Assessment & Plan     Improved.  No changes needed  To fix prandial hyperglycemia she can adjust her diet or change her  carb ratio         Relevant Medications    insulin lispro (HumaLOG) 100 UNIT/ML injection    Glucagon (Baqsimi One Pack) 3 MG/DOSE powder    Other Relevant Orders    POC Glucose, Blood (Completed)    POC Glycosylated Hemoglobin (Hb A1C) (Completed)         Electronically signed by : Mario Moe MD  04/03/2024

## 2024-04-18 ENCOUNTER — PRIOR AUTHORIZATION (OUTPATIENT)
Dept: ENDOCRINOLOGY | Facility: CLINIC | Age: 34
End: 2024-04-18
Payer: COMMERCIAL

## 2024-04-18 NOTE — TELEPHONE ENCOUNTER
SERGIO BREWSTER (Key: [id number])  PA Case ID #: [id number]  Need Help? Call us at [phone]  Outcome  Approved today  Your PA request has been approved. Additional information will be provided in the approval communication. (Message 1143)  Authorization Expiration Date: 4/18/2025  Drug  Omnipod 5 G6 Pods (Gen 5)  ePA cloud logo  Form  [facility] Electronic PA Form (2017 NCP[employer])

## 2024-04-22 ENCOUNTER — TELEPHONE (OUTPATIENT)
Dept: ENDOCRINOLOGY | Facility: CLINIC | Age: 34
End: 2024-04-22
Payer: COMMERCIAL

## 2024-04-22 RX ORDER — PEN NEEDLE, DIABETIC 32GX 5/32"
1 NEEDLE, DISPOSABLE MISCELLANEOUS DAILY
Qty: 100 EACH | Refills: 3 | Status: SHIPPED | OUTPATIENT
Start: 2024-04-22

## 2024-04-22 NOTE — TELEPHONE ENCOUNTER
PATIENT IS CALLING WANTING A PRESCRIPTION FOR LANTUS. HER INSURANCE IS NOT COVERING OMNI POD. PATIENTS NUMBER [id number]

## 2024-04-26 ENCOUNTER — TELEPHONE (OUTPATIENT)
Dept: ENDOCRINOLOGY | Facility: CLINIC | Age: 34
End: 2024-04-26
Payer: COMMERCIAL

## 2024-04-26 NOTE — TELEPHONE ENCOUNTER
Spoke with patient regarding [employer] paperwork.  She states she would like it filled out to check the pricing.

## 2024-07-18 ENCOUNTER — TELEPHONE (OUTPATIENT)
Dept: ENDOCRINOLOGY | Facility: CLINIC | Age: 34
End: 2024-07-18
Payer: COMMERCIAL

## 2024-07-18 NOTE — TELEPHONE ENCOUNTER
Caller: Gricelda Suggs    Relationship to patient: Self    Best call back number: [phone]    Chief complaint: DIABETES MANAGEMENT     Type of visit: F/U 2     Requested date: ASAP      If rescheduling, when is the original appointment: 01/08/25     Additional notes: PT DID NOT RECEIVE PHONE CALL TO [facility] 6 MOS F/U WHEN DR FLORES CHANGED OFFICES. PT IS [facility] AND N WAIT LIST, PLEASE CALL WITH AN EARLIER APPT IF POSSIBLE.

## 2024-08-19 ENCOUNTER — OFFICE VISIT (OUTPATIENT)
Dept: CARDIOLOGY | Facility: CLINIC | Age: 34
End: 2024-08-19
Payer: COMMERCIAL

## 2024-08-19 VITALS
DIASTOLIC BLOOD PRESSURE: 86 MMHG | WEIGHT: 246 LBS | OXYGEN SATURATION: 99 % | SYSTOLIC BLOOD PRESSURE: 138 MMHG | HEIGHT: 67 IN | HEART RATE: 104 BPM | BODY MASS INDEX: 38.61 KG/M2

## 2024-08-19 DIAGNOSIS — E78.2 MIXED HYPERLIPIDEMIA: ICD-10-CM

## 2024-08-19 DIAGNOSIS — R06.09 DOE (DYSPNEA ON EXERTION): ICD-10-CM

## 2024-08-19 DIAGNOSIS — R00.2 PALPITATIONS: Primary | ICD-10-CM

## 2024-08-19 DIAGNOSIS — I10 PRIMARY HYPERTENSION: Chronic | ICD-10-CM

## 2024-08-19 PROBLEM — E78.5 HYPERLIPIDEMIA: Status: ACTIVE | Noted: 2024-02-02

## 2024-08-19 PROCEDURE — 93000 ELECTROCARDIOGRAM COMPLETE: CPT | Performed by: NURSE PRACTITIONER

## 2024-08-19 PROCEDURE — 99204 OFFICE O/P NEW MOD 45 MIN: CPT | Performed by: NURSE PRACTITIONER

## 2024-08-19 RX ORDER — ALPRAZOLAM 1 MG/1
1 TABLET ORAL
COMMUNITY
Start: 2024-08-02

## 2024-08-19 RX ORDER — LOSARTAN POTASSIUM 100 MG/1
100 TABLET ORAL DAILY
COMMUNITY
Start: 2024-08-12

## 2024-08-19 RX ORDER — AMLODIPINE BESYLATE 5 MG/1
5 TABLET ORAL DAILY
COMMUNITY
Start: 2024-08-02

## 2024-08-19 NOTE — PROGRESS NOTES
"    [facility] Provider Note     Date:   2024  Name: Gricelda Suggs  :   1990  PCP: Eveline Evangelista APRN    Chief Complaint   Patient presents with    [facility]     Neck size - 15\"       Subjective     History of Present Illness  Gricelda Suggs is a 34 y.o. female who presents today for establish cardiac care.    Patient states that she is here because of her frequent palpitations.  She states that nothing brings them on she can just be sitting or driving and her heart will start beating really fast.  Patient states that she can be just walking and her heart rate goes up.  She reports shortness of air with exertion denies any chest pain or palpitations with activity.  She denies any edema, presyncope, or syncope.  She states about 4 or 5 years ago she had an echo.  She states that her PCP did a HST and a Holter monitor on her.  She states that the HST did not show any sleep apnea.  Her Holter monitor was done in August of last year and showed a tachycardia burden of 5.32% with the highest heart rate 140 bpm, her PAC and PVC events were<0.1%.  She states her grandfather had atrial fibrillation and her sister has SVT.     Cardiac/DOMINGO History:    HST 24 non-diagnostic     EKG 8/3/23 sinus rhythm    Holter monitor 23 Tachycardia burden  5/32%, PVC and PAC burden <0.1%.    Co-existing, DM Type 1, hypercholesterolemia, HTN,  anemia, and Hashimoto's     Reports Denies   Chest Pain [] [x]   Shortness of Air [x]Exertion []   Palpitations [x] []   Edema [] [x]   Dizziness [] [x]   Syncope [] [x]         No Known Allergies    Current Outpatient Medications:     ALPRAZolam (XANAX) 1 MG tablet, Take 1 tablet by mouth., Disp: , Rfl:     amLODIPine (NORVASC) 5 MG tablet, Take 1 tablet by mouth Daily., Disp: , Rfl:     atorvastatin (LIPITOR) 10 MG tablet, Take 1 tablet by mouth Every Night., Disp: , Rfl:     busPIRone (BUSPAR) 5 MG tablet, Take 1 tablet by mouth 2 " (Two) Times a Day., Disp: , Rfl:     Continuous Blood Gluc  (Dexcom G6 ) device, 1 kit Every 3 (Three) Months., Disp: 1 each, Rfl: 0    Continuous Blood Gluc Sensor (Dexcom G6 Sensor), Every 10 (Ten) Days., Disp: 9 each, Rfl: 3    Continuous Blood Gluc Transmit (Dexcom G6 Transmitter) misc, USE AS DIRECTED, Disp: 1 each, Rfl: 3    Glucagon (Baqsimi One Pack) 3 MG/DOSE powder, 3 mg into the nostril(s) as directed by provider Daily As Needed (severe hypoglycemia)., Disp: 1 each, Rfl: 5    Insulin Disposable Pump (Omnipod 5 G6 Pod, Gen 5,) misc, CHANGE POD EVERY 3 DAYS, Disp: 30 each, Rfl: 2    Insulin Lispro (HumaLOG) 100 UNIT/ML injection, 100 units per day via insulin pump. Ok to dispense lispro as generic, Disp: 30 mL, Rfl: 12    Insulin Pen Needle (BD Pen Needle Radha 2nd Gen) 32G X 4 MM misc, Use 1 each Daily., Disp: 100 each, Rfl: 3    loratadine (CLARITIN) 10 MG tablet, Take 1 tablet by mouth Daily., Disp: , Rfl:     losartan (COZAAR) 100 MG tablet, Take 1 tablet by mouth Daily., Disp: , Rfl:     sertraline (ZOLOFT) 50 MG tablet, Take 1 tablet by mouth Daily., Disp: , Rfl:     Sprintec 28 0.25-35 MG-MCG per tablet, Daily., Disp: , Rfl:     Past Medical History:   Diagnosis Date    Hashimoto's thyroiditis 2002    Hypercholesterolemia     Hypertension 9/2024    Hypoglycemia due to type 1 diabetes mellitus     Type 1 diabetes mellitus, uncontrolled     Vitamin D deficiency 09/12/22      History reviewed. No pertinent surgical history.  Family History   Problem Relation Age of Onset    Hypertension Mother         low    Thyroid disease Mother     Hyperlipidemia Mother     Diabetes Father     Hypertension Father     Cancer Father         bladder    Obesity Father     Anemia Sister      Social History     Socioeconomic History    Marital status:    Tobacco Use    Smoking status: Never     Passive exposure: Never    Smokeless tobacco: Never   Vaping Use    Vaping status: Never Used   Substance  "and Sexual Activity    Alcohol use: Yes     Alcohol/week: 2.0 standard drinks of alcohol     Types: 2 Glasses of wine per week     Comment: occasional    Drug use: Never    Sexual activity: Yes     Partners: Male     Birth control/protection: Birth control pill       Objective     Vital Signs:  /86 (BP Location: Left arm, Patient Position: Sitting)   Pulse 104   Ht 170.2 cm (67\")   Wt 112 kg (246 lb)   SpO2 99%   BMI 38.53 kg/m²   Estimated body mass index is 38.53 kg/m² as calculated from the following:    Height as of this encounter: 170.2 cm (67\").    Weight as of this encounter: 112 kg (246 lb).             Physical Exam  Constitutional:       Appearance: Normal appearance. She is obese.   Cardiovascular:      Rate and Rhythm: Regular rhythm. Tachycardia present.      Pulses: Normal pulses.      Heart sounds: Normal heart sounds.   Pulmonary:      Effort: Pulmonary effort is normal.      Breath sounds: Normal breath sounds.   Musculoskeletal:      Right lower leg: No edema.      Left lower leg: No edema.   Skin:     General: Skin is warm and dry.      Capillary Refill: Capillary refill takes less than 2 seconds.   Neurological:      General: No focal deficit present.      Mental Status: She is alert and oriented to person, place, and time.   Psychiatric:         Mood and Affect: Mood normal.         Behavior: Behavior normal.         Thought Content: Thought content normal.         Judgment: Judgment normal.         The following data was reviewed by: MAURICIO Persaud on 08/19/2024:  Labs 8/3/2024: Cholesterol 146, triglycerides 277, HDL 46, LDL 43, A1c 7.2, CMP, and CBC.    Referral note from MAURICIO Hines has been reviewed.    Holter monitor 8/12/23 has been reviewed.      ECG 12 Lead    Date/Time: 8/19/2024 10:52 AM  Performed by: Arabella Quiñones APRN    Authorized by: Arabella Quiñones APRN  Comparison: compared with previous ECG from 8/3/2023  Similar to previous ECG  Rhythm: " sinus rhythm  Rate: normal  Conduction: conduction normal  ST Segments: ST segments normal  QRS axis: normal  Other findings comments: nonspecific T wave abnormality    Clinical impression: abnormal EKG  Comments: Flattened T wave           Assessment and Plan     Diagnoses and all orders for this visit:    1. Palpitations (Primary)  Assessment & Plan:  Reports worsening palpitations.  She states nothing brings them on she can just be siting or driving.    Patient reports she can be just walking and her heart becomes tachycardic.  Will repeat heart monitor for 30 days to check PVC, PAC, and tachycardia burden.    Orders:  -     ECG 12 Lead  -     Adult Transthoracic Echo Complete W/ Cont if Necessary Per Protocol; Future  -     Mobile Cardiac Outpatient Telemetry    2. Primary hypertension  Assessment & Plan:  Hypertension is stable and controlled  Continue current treatment regimen.  Blood pressure will be reassessed in 6 months.    On Amlodipine and losartan      3. Mixed hyperlipidemia  Assessment & Plan:   Lipid abnormalities are improving with treatment    Plan:  Continue same medication/s without change.      Discussed medication dosage, use, side effects, and goals of treatment in detail.    Counseled patient on lifestyle modifications to help control hyperlipidemia.     Patient Treatment Goals:   LDL goal is less than 70    Followup in 6 months.    Labs 8/3/2024: Cholesterol 146, triglycerides 277, HDL 46, LDL 43, A1c 7.2      4. ZAFAR (dyspnea on exertion)  Assessment & Plan:  Patient reports shortness of breath with exertion when climbing stairs or just walking short distance.    Plan ECHO to evaluate any structural or valvular abnormalities.    Orders:  -     Adult Transthoracic Echo Complete W/ Cont if Necessary Per Protocol; Future        Recommendations: ER if symptoms increase, Report if any new/changing symptoms immediately, and Limit caffeine          Follow Up  Return in about 6 weeks (around  9/30/2024) for Test results..  Patient was given instructions and counseling regarding her condition or for health maintenance advice. Please see specific information pulled into the AVS if appropriate.

## 2024-08-19 NOTE — ASSESSMENT & PLAN NOTE
Lipid abnormalities are improving with treatment    Plan:  Continue same medication/s without change.      Discussed medication dosage, use, side effects, and goals of treatment in detail.    Counseled patient on lifestyle modifications to help control hyperlipidemia.     Patient Treatment Goals:   LDL goal is less than 70    Followup in 6 months.    Labs 8/3/2024: Cholesterol 146, triglycerides 277, HDL 46, LDL 43, A1c 7.2

## 2024-08-19 NOTE — ASSESSMENT & PLAN NOTE
Patient reports shortness of breath with exertion when climbing stairs or just walking short distance.    Plan ECHO to evaluate any structural or valvular abnormalities.

## 2024-08-19 NOTE — ASSESSMENT & PLAN NOTE
Hypertension is stable and controlled  Continue current treatment regimen.  Blood pressure will be reassessed in 6 months.    On Amlodipine and losartan

## 2024-08-19 NOTE — ASSESSMENT & PLAN NOTE
Reports worsening palpitations.  She states nothing brings them on she can just be siting or driving.    Patient reports she can be just walking and her heart becomes tachycardic.  Will repeat heart monitor for 30 days to check PVC, PAC, and tachycardia burden.

## 2024-08-21 ENCOUNTER — PATIENT ROUNDING (BHMG ONLY) (OUTPATIENT)
Dept: CARDIOLOGY | Facility: CLINIC | Age: 34
End: 2024-08-21
Payer: COMMERCIAL

## 2024-08-21 NOTE — PROGRESS NOTES
..My name is Ana Aguilera and I am the Practice Manager for [facility].    I would like to thank you for being a loyal patient. If you do not mind I would like to ask you a few questions about your recent visit with us.  Please feel free to reply if you wish to provide us with feedback on your first visit with our practice.    First, could you tell me what went well with your recent visit?    Secondly, we are always looking for ways to make our patients' experiences even better.  Do you have any recommendations on ways we may improve?    Finally, overall were you satisfied with your first visit to us as a [facility] [place] facility?    In the next few days, you will be receiving a Patient Experience Survey.      Thank you for taking the time to answer a few questions today.  I hope you have a good day.

## 2024-09-13 ENCOUNTER — TELEPHONE (OUTPATIENT)
Dept: CARDIOLOGY | Facility: CLINIC | Age: 34
End: 2024-09-13
Payer: COMMERCIAL

## 2024-09-13 NOTE — TELEPHONE ENCOUNTER
I spoke with patient about the holter alert and what the physicians said, she verbally understood. Thanks.

## 2024-09-13 NOTE — TELEPHONE ENCOUNTER
JOHAN AVILA CALLED WITH AN URGENT ALERT FOR PATIENT    MONITOR SHOW THAT IT WAS AN AUTO TRIGGERED 8 BEATS OF V-TACH WITH 191 UNDERLYING RHYTHM WAS SINUS TACHYCARDIA OCCURRED AT 7:25 AM.     SAIMA GARCIA WILL PULL IT FROM THE [employer] WEBSITE.

## 2024-09-13 NOTE — TELEPHONE ENCOUNTER
I also spoke with Dr. Whitt.    Per Jose Eduardo she said as long as the patient is asymptomatic for that date and time to leave it and we can keep watching it.

## 2024-09-30 ENCOUNTER — OFFICE VISIT (OUTPATIENT)
Dept: CARDIOLOGY | Facility: CLINIC | Age: 34
End: 2024-09-30
Payer: COMMERCIAL

## 2024-09-30 VITALS
SYSTOLIC BLOOD PRESSURE: 122 MMHG | DIASTOLIC BLOOD PRESSURE: 74 MMHG | HEIGHT: 67 IN | HEART RATE: 96 BPM | OXYGEN SATURATION: 98 % | BODY MASS INDEX: 38.45 KG/M2 | WEIGHT: 245 LBS

## 2024-09-30 DIAGNOSIS — R06.09 DOE (DYSPNEA ON EXERTION): ICD-10-CM

## 2024-09-30 DIAGNOSIS — R00.2 PALPITATIONS: Primary | ICD-10-CM

## 2024-09-30 PROCEDURE — 99213 OFFICE O/P EST LOW 20 MIN: CPT | Performed by: NURSE PRACTITIONER

## 2024-09-30 RX ORDER — SUBCUTANEOUS INSULIN PUMP
EACH MISCELLANEOUS
COMMUNITY

## 2024-09-30 RX ORDER — BLOOD-GLUCOSE SENSOR
EACH MISCELLANEOUS
COMMUNITY

## 2024-09-30 RX ORDER — PROPRANOLOL HYDROCHLORIDE 40 MG/1
40 TABLET ORAL 2 TIMES DAILY
Qty: 180 TABLET | Refills: 2 | Status: SHIPPED | OUTPATIENT
Start: 2024-09-30

## 2024-09-30 RX ORDER — PROPRANOLOL HYDROCHLORIDE 40 MG/1
40 TABLET ORAL 3 TIMES DAILY
Qty: 180 TABLET | Refills: 2 | Status: SHIPPED | OUTPATIENT
Start: 2024-09-30 | End: 2024-09-30

## 2024-09-30 NOTE — ASSESSMENT & PLAN NOTE
Continues to complain of palpitations twice a day.  She reports that they go away about as quickly as they come on.  Holter monitor reviewed which showed showed 1 episode of sinus tachycardia with ventricular tachycardia 8 beats at a rate of 191 bpm.  PVC burden<1%.  No critical rhythms or pauses.    Plan to start propranolol 40 mg twice a day.  Patient to report any issues or reactions to medication.  Limit caffeine intake.  Follow-up in 3 weeks

## 2024-09-30 NOTE — PROGRESS NOTES
[facility] Provider Note     Date:   2024  Name: Gricelda Suggs  :   1990  PCP: Eveline Evangelista APRN    Chief Complaint   Patient presents with    Palpitations       Subjective     History of Present Illness  Gricelda Suggs is a 34 y.o. female who presents today for follow up test results.    Patient states she is having palpitations at least a couple times a day.  She states they only last a few seconds.  She reports shortness of breath on exertion.  She denies any chest pain.  Echo results have been reviewed and discussed with patient in detail.  We have called to request the live monitor report.  We have discussed starting a beta-blocker and patient is agreeable.  Side effects of medication have been discussed with patient.  She verbalizes understanding.    Cardiac/DOMINGO History:    Holter monitor 24 showed 1 episode of sinus tachycardia with ventricular tachycardia 8 beats at a rate of 191 bpm.  PVC burden<1%.  No critical rhythms or pauses.    ECHO 24  Left ventricular systolic function is normal. Left ventricular ejection fraction appears to be 61 - 65%.  ·  Left ventricular diastolic function was normal.  ·  No significant valvular regurgitation or stenosis present.  ·  Estimated right ventricular systolic pressure from tricuspid regurgitation is normal (<35 mmHg).    HST 24 non-diagnostic     EKG 8/3/23 sinus rhythm    Holter monitor 23 Tachycardia burden  5.32%, PVC and PAC burden <0.1%.    Co-existing, DM Type 1, hypercholesterolemia, HTN,  anemia, and Hashimoto's     Reports Denies   Chest Pain [] [x]   Shortness of Air [x] []   Palpitations [x] []   Edema [] [x]   Dizziness [] [x]   Syncope [] [x]       No Known Allergies    Current Outpatient Medications:     amLODIPine (NORVASC) 5 MG tablet, Take 1 tablet by mouth Daily., Disp: , Rfl:     atorvastatin (LIPITOR) 10 MG tablet, Take 1 tablet by mouth Every Night., Disp: , Rfl:      busPIRone (BUSPAR) 5 MG tablet, Take 1 tablet by mouth 2 (Two) Times a Day., Disp: , Rfl:     Continuous Glucose Sensor (Guardian 4 Glucose Sensor) misc, , Disp: , Rfl:     Glucagon (Baqsimi One Pack) 3 MG/DOSE powder, 3 mg into the nostril(s) as directed by provider Daily As Needed (severe hypoglycemia)., Disp: 1 each, Rfl: 5    Insulin Infusion Pump (MiniMed 780G Insulin Pump) kit, 34 units, Disp: , Rfl:     Insulin Lispro (HumaLOG) 100 UNIT/ML injection, 100 units per day via insulin pump. Ok to dispense lispro as generic, Disp: 30 mL, Rfl: 12    loratadine (CLARITIN) 10 MG tablet, Take 1 tablet by mouth Daily., Disp: , Rfl:     losartan (COZAAR) 100 MG tablet, Take 1 tablet by mouth Daily., Disp: , Rfl:     propranolol (INDERAL) 40 MG tablet, Take 1 tablet by mouth 2 (Two) Times a Day., Disp: 180 tablet, Rfl: 2    sertraline (ZOLOFT) 50 MG tablet, Take 1 tablet by mouth Daily., Disp: , Rfl:     Sprintec 28 0.25-35 MG-MCG per tablet, Daily., Disp: , Rfl:     Past Medical History:   Diagnosis Date    Hashimoto's thyroiditis 2002    Hypercholesterolemia     Hypertension 9/2024    Hypoglycemia due to type 1 diabetes mellitus     Type 1 diabetes mellitus, uncontrolled     Vitamin D deficiency 09/12/22      History reviewed. No pertinent surgical history.  Family History   Problem Relation Age of Onset    Hypertension Mother         low    Thyroid disease Mother     Hyperlipidemia Mother     Diabetes Father     Hypertension Father     Cancer Father         bladder    Obesity Father     Anemia Sister      Social History     Socioeconomic History    Marital status:    Tobacco Use    Smoking status: Never     Passive exposure: Never    Smokeless tobacco: Never   Vaping Use    Vaping status: Never Used   Substance and Sexual Activity    Alcohol use: Yes     Alcohol/week: 2.0 standard drinks of alcohol     Types: 2 Glasses of wine per week     Comment: occasional    Drug use: Never    Sexual activity: Yes      "Partners: Male     Birth control/protection: Birth control pill       Objective     Vital Signs:  /74 (BP Location: Right arm, Patient Position: Sitting)   Pulse 96   Ht 170.2 cm (67\")   Wt 111 kg (245 lb)   SpO2 98%   BMI 38.37 kg/m²   Estimated body mass index is 38.37 kg/m² as calculated from the following:    Height as of this encounter: 170.2 cm (67\").    Weight as of this encounter: 111 kg (245 lb).             Physical Exam  Constitutional:       Appearance: Normal appearance. She is obese.   Cardiovascular:      Rate and Rhythm: Normal rate and regular rhythm.      Heart sounds: Normal heart sounds.   Pulmonary:      Effort: Pulmonary effort is normal.   Musculoskeletal:      Right lower leg: No edema.      Left lower leg: No edema.   Skin:     General: Skin is warm and dry.   Neurological:      General: No focal deficit present.      Mental Status: She is alert and oriented to person, place, and time.   Psychiatric:         Mood and Affect: Mood normal.         Behavior: Behavior normal.         Thought Content: Thought content normal.         Judgment: Judgment normal.         The following data was reviewed by: MAURICIO Persaud on 09/30/2024:    Echo 9/11/2024 has been reviewed and discussed with patient.    Holter monitor 9/17/2024 has been reviewed.          Assessment and Plan     Diagnoses and all orders for this visit:    1. Palpitations (Primary)  Assessment & Plan:  Continues to complain of palpitations twice a day.  She reports that they go away about as quickly as they come on.  Holter monitor reviewed which showed showed 1 episode of sinus tachycardia with ventricular tachycardia 8 beats at a rate of 191 bpm.  PVC burden<1%.  No critical rhythms or pauses.    Plan to start propranolol 40 mg twice a day.  Patient to report any issues or reactions to medication.  Limit caffeine intake.  Follow-up in 3 weeks    Orders:  -     Discontinue: propranolol (INDERAL) 40 MG tablet; Take 1 " tablet by mouth 3 (Three) Times a Day.  Dispense: 180 tablet; Refill: 2  -     propranolol (INDERAL) 40 MG tablet; Take 1 tablet by mouth 2 (Two) Times a Day.  Dispense: 180 tablet; Refill: 2    2. ZAFAR (dyspnea on exertion)  Assessment & Plan:  Echo 9/11/2024 shows normal heart function 61 to 65%.  RVSP is normal.              Recommendations: ER if symptoms increase, Report if any new/changing symptoms immediately, and Limit salt          Follow Up  Return in about 3 weeks (around 10/21/2024) for Medication changes.  Patient was given instructions and counseling regarding her condition or for health maintenance advice. Please see specific information pulled into the AVS if appropriate.

## 2024-10-22 ENCOUNTER — OFFICE VISIT (OUTPATIENT)
Dept: CARDIOLOGY | Facility: CLINIC | Age: 34
End: 2024-10-22
Payer: COMMERCIAL

## 2024-10-22 VITALS
WEIGHT: 245.9 LBS | DIASTOLIC BLOOD PRESSURE: 76 MMHG | OXYGEN SATURATION: 99 % | HEIGHT: 67 IN | HEART RATE: 78 BPM | BODY MASS INDEX: 38.6 KG/M2 | SYSTOLIC BLOOD PRESSURE: 128 MMHG

## 2024-10-22 DIAGNOSIS — R00.2 PALPITATIONS: Primary | ICD-10-CM

## 2024-10-22 DIAGNOSIS — I10 PRIMARY HYPERTENSION: Chronic | ICD-10-CM

## 2024-10-22 DIAGNOSIS — R06.09 DOE (DYSPNEA ON EXERTION): ICD-10-CM

## 2024-10-22 DIAGNOSIS — E78.2 MIXED HYPERLIPIDEMIA: ICD-10-CM

## 2024-10-22 PROCEDURE — 99213 OFFICE O/P EST LOW 20 MIN: CPT | Performed by: NURSE PRACTITIONER

## 2024-10-22 RX ORDER — AMLODIPINE BESYLATE 5 MG/1
5 TABLET ORAL DAILY
Qty: 90 TABLET | Refills: 2 | Status: SHIPPED | OUTPATIENT
Start: 2024-10-22

## 2024-10-22 NOTE — PROGRESS NOTES
[facility] Provider Note     Date:   10/22/2024  Name: Gricelda Suggs  :   1990  PCP: Eveline Evangelista APRN    Chief Complaint   Patient presents with    Follow-up     For medication change       Subjective     History of Present Illness  Gricelda Suggs is a 34 y.o. female who presents today for follow up medication change.    Patient states that she has been doing well with the propranolol.  She states that her blood pressures have been running 110-120 systolic and her heart rate has not been over 90 since starting the medication.  She states that she is not feeling the palpitations as she did before.  She denies any exertional chest pain or shortness of air.  She denies any edema, presyncope or syncope.    Cardiac/DOMINGO History:    Holter monitor 24 showed 1 episode of sinus tachycardia with ventricular tachycardia 8 beats at a rate of 191 bpm.  PVC burden<1%.  No critical rhythms or pauses.    ECHO 24  Left ventricular systolic function is normal. Left ventricular ejection fraction appears to be 61 - 65%.  ·  Left ventricular diastolic function was normal.  ·  No significant valvular regurgitation or stenosis present.  ·  Estimated right ventricular systolic pressure from tricuspid regurgitation is normal (<35 mmHg).    HST 24 non-diagnostic     EKG 8/3/23 sinus rhythm    Holter monitor 23 Tachycardia burden  5.32%, PVC and PAC burden <0.1%.    Co-existing, DM Type 1, hypercholesterolemia, HTN,  anemia, and Hashimoto's      No Known Allergies    Current Outpatient Medications:     amLODIPine (NORVASC) 5 MG tablet, Take 1 tablet by mouth Daily., Disp: 90 tablet, Rfl: 2    atorvastatin (LIPITOR) 10 MG tablet, Take 1 tablet by mouth Every Night., Disp: , Rfl:     busPIRone (BUSPAR) 5 MG tablet, Take 1 tablet by mouth 2 (Two) Times a Day., Disp: , Rfl:     Continuous Glucose Sensor (Guardian 4 Glucose Sensor) misc, , Disp: , Rfl:     Glucagon (Baqsimi  One Pack) 3 MG/DOSE powder, 3 mg into the nostril(s) as directed by provider Daily As Needed (severe hypoglycemia)., Disp: 1 each, Rfl: 5    Insulin Infusion Pump (MiniMed 780G Insulin Pump) kit, 34 units, Disp: , Rfl:     Insulin Lispro (HumaLOG) 100 UNIT/ML injection, 100 units per day via insulin pump. Ok to dispense lispro as generic, Disp: 30 mL, Rfl: 12    loratadine (CLARITIN) 10 MG tablet, Take 1 tablet by mouth Daily., Disp: , Rfl:     losartan (COZAAR) 100 MG tablet, Take 1 tablet by mouth Daily., Disp: , Rfl:     propranolol (INDERAL) 40 MG tablet, Take 1 tablet by mouth 2 (Two) Times a Day., Disp: 180 tablet, Rfl: 2    sertraline (ZOLOFT) 50 MG tablet, Take 1 tablet by mouth Daily., Disp: , Rfl:     Sprintec 28 0.25-35 MG-MCG per tablet, Daily., Disp: , Rfl:     Past Medical History:   Diagnosis Date    Hashimoto's thyroiditis 2002    Hypercholesterolemia     Hypertension 9/2024    Hypoglycemia due to type 1 diabetes mellitus     Type 1 diabetes mellitus, uncontrolled     Vitamin D deficiency 09/12/22      History reviewed. No pertinent surgical history.  Family History   Problem Relation Age of Onset    Hypertension Mother         low    Thyroid disease Mother     Hyperlipidemia Mother     Diabetes Father     Hypertension Father     Cancer Father         bladder    Obesity Father     Anemia Sister      Social History     Socioeconomic History    Marital status:    Tobacco Use    Smoking status: Never     Passive exposure: Never    Smokeless tobacco: Never   Vaping Use    Vaping status: Never Used   Substance and Sexual Activity    Alcohol use: Yes     Alcohol/week: 2.0 standard drinks of alcohol     Types: 2 Glasses of wine per week     Comment: occasional    Drug use: Never    Sexual activity: Yes     Partners: Male     Birth control/protection: Birth control pill       Objective     Vital Signs:  /76 (BP Location: Left arm, Patient Position: Sitting, Cuff Size: Large Adult)   Pulse 78    "Ht 170.2 cm (67\")   Wt 112 kg (245 lb 14.4 oz)   SpO2 99%   BMI 38.51 kg/m²   Estimated body mass index is 38.51 kg/m² as calculated from the following:    Height as of this encounter: 170.2 cm (67\").    Weight as of this encounter: 112 kg (245 lb 14.4 oz).             Physical Exam  Constitutional:       Appearance: Normal appearance.   Neurological:      General: No focal deficit present.      Mental Status: She is alert and oriented to person, place, and time.   Psychiatric:         Mood and Affect: Mood normal.         Behavior: Behavior normal.         Thought Content: Thought content normal.         Judgment: Judgment normal.                       Assessment and Plan     Diagnoses and all orders for this visit:    1. Palpitations (Primary)  Assessment & Plan:  Patient reports that her palpitations have improved since starting on the propranolol.  She states that her heart rate has not been over 90 since starting on the medication.      2. Primary hypertension  Assessment & Plan:  Hypertension is stable and controlled  Continue current treatment regimen.  Blood pressure will be reassessed in 6 months.    Patient to continue her amlodipine, losartan, and propranolol.    Orders:  -     amLODIPine (NORVASC) 5 MG tablet; Take 1 tablet by mouth Daily.  Dispense: 90 tablet; Refill: 2    3. ZAFAR (dyspnea on exertion)  Assessment & Plan:  Patient reports that she has stable shortness of breath.      4. Mixed hyperlipidemia  Assessment & Plan:   Patient is on atorvastatin and her PCP will recheck her labs in a couple of weeks.                    Follow Up  Return in about 6 months (around 4/22/2025) for HTN/Palpitations.  Patient was given instructions and counseling regarding her condition or for health maintenance advice. Please see specific information pulled into the AVS if appropriate.  "

## 2024-10-22 NOTE — ASSESSMENT & PLAN NOTE
Hypertension is stable and controlled  Continue current treatment regimen.  Blood pressure will be reassessed in 6 months.    Patient to continue her amlodipine, losartan, and propranolol.

## 2024-10-22 NOTE — ASSESSMENT & PLAN NOTE
Patient reports that her palpitations have improved since starting on the propranolol.  She states that her heart rate has not been over 90 since starting on the medication.

## 2024-10-30 ENCOUNTER — OFFICE VISIT (OUTPATIENT)
Age: 34
End: 2024-10-30
Payer: COMMERCIAL

## 2024-10-30 VITALS
WEIGHT: 245 LBS | HEART RATE: 67 BPM | DIASTOLIC BLOOD PRESSURE: 74 MMHG | OXYGEN SATURATION: 97 % | BODY MASS INDEX: 38.45 KG/M2 | SYSTOLIC BLOOD PRESSURE: 126 MMHG | HEIGHT: 67 IN

## 2024-10-30 DIAGNOSIS — E10.65 UNCONTROLLED TYPE 1 DIABETES MELLITUS WITH HYPERGLYCEMIA: Primary | ICD-10-CM

## 2024-10-30 LAB
EXPIRATION DATE: ABNORMAL
EXPIRATION DATE: ABNORMAL
GLUCOSE BLDC GLUCOMTR-MCNC: 149 MG/DL (ref 70–130)
HBA1C MFR BLD: 7 % (ref 4.5–5.7)
Lab: ABNORMAL
Lab: ABNORMAL

## 2024-10-30 PROCEDURE — 82043 UR ALBUMIN QUANTITATIVE: CPT | Performed by: INTERNAL MEDICINE

## 2024-10-30 PROCEDURE — 82570 ASSAY OF URINE CREATININE: CPT | Performed by: INTERNAL MEDICINE

## 2024-10-30 NOTE — PROGRESS NOTES
"     Office Note      Date: 10/30/2024  Patient Name: Gricelda Suggs  MRN: [id number]  : 1990    Chief Complaint   Patient presents with    Diabetes       History of Present Illness:   Gricelda Suggs is a 34 y.o. female who presents for Diabetes  . Type 1   Current rx: insulin in a medtronic 780 g with a guardian 4 sensor     Changes in history: none  Questions /problems:none    Subjective          Review of Systems:   Review of Systems   Endocrine: Negative for polydipsia and polyuria.       The following portions of the patient's history were reviewed and updated as appropriate: allergies, current medications, past family history, past medical history, past social history, past surgical history, and problem list.    Objective     Visit Vitals  /74 (BP Location: Right arm, Patient Position: Sitting, Cuff Size: Adult)   Pulse 67   Ht 170.2 cm (67\")   Wt 111 kg (245 lb)   SpO2 97%   BMI 38.37 kg/m²           Physical Exam:  Physical Exam  Vitals reviewed.   Constitutional:       Appearance: Normal appearance.   Neurological:      Mental Status: She is alert.   Psychiatric:         Mood and Affect: Mood normal.         Behavior: Behavior normal.         Thought Content: Thought content normal.         Judgment: Judgment normal.       Assessment / Plan      Assessment & Plan:  Problem List Items Addressed This Visit       Uncontrolled type 1 diabetes mellitus with hyperglycemia - Primary    Overview     Diagnosed age 2         Current Assessment & Plan      Stable A1c - 7  Plan : no changes. Check mali          Relevant Medications    Glucagon (Baqsimi One Pack) 3 MG/DOSE powder    Insulin Lispro (HumaLOG) 100 UNIT/ML injection    Other Relevant Orders    POC Glucose, Blood (Completed)    POC Glycosylated Hemoglobin (Hb A1C) (Completed)    Microalbumin / Creatinine Urine Ratio - Urine, Clean Catch        Electronically signed by : Mario Moe MD   10/30/2024    "

## 2024-10-31 LAB
ALBUMIN UR-MCNC: <1.2 MG/DL
CREAT UR-MCNC: 59.7 MG/DL
MICROALBUMIN/CREAT UR: NORMAL MG/G{CREAT}

## 2024-11-01 ENCOUNTER — TELEPHONE (OUTPATIENT)
Dept: ENDOCRINOLOGY | Facility: CLINIC | Age: 34
End: 2024-11-01
Payer: COMMERCIAL

## 2024-11-01 RX ORDER — BLOOD SUGAR DIAGNOSTIC
STRIP MISCELLANEOUS
Qty: 100 EACH | Refills: 4 | Status: SHIPPED | OUTPATIENT
Start: 2024-11-01

## 2024-11-01 RX ORDER — BLOOD SUGAR DIAGNOSTIC
STRIP MISCELLANEOUS
Qty: 100 EACH | Refills: 4 | Status: SHIPPED | OUTPATIENT
Start: 2024-11-01 | End: 2024-11-01 | Stop reason: SDUPTHER

## 2024-11-01 NOTE — TELEPHONE ENCOUNTER
Rx Refill Note  Requested Prescriptions      No prescriptions requested or ordered in this encounter          Last office visit with prescribing clinician: 10/30/2024     Next office visit with prescribing clinician: 1/8/2025         Keyonna Parker MA  11/01/24, 10:47 EDT

## 2024-11-01 NOTE — TELEPHONE ENCOUNTER
Willis odell is needing the frequency on patients Accu-check guide test strips.     Please call back at [phone]

## 2025-04-22 ENCOUNTER — PATIENT ROUNDING (BHMG ONLY) (OUTPATIENT)
Dept: CARDIOLOGY | Facility: CLINIC | Age: 35
End: 2025-04-22
Payer: COMMERCIAL

## 2025-04-22 ENCOUNTER — OFFICE VISIT (OUTPATIENT)
Dept: CARDIOLOGY | Facility: CLINIC | Age: 35
End: 2025-04-22
Payer: COMMERCIAL

## 2025-04-22 VITALS
WEIGHT: 249 LBS | HEIGHT: 67 IN | SYSTOLIC BLOOD PRESSURE: 126 MMHG | OXYGEN SATURATION: 97 % | DIASTOLIC BLOOD PRESSURE: 80 MMHG | BODY MASS INDEX: 39.08 KG/M2 | HEART RATE: 71 BPM

## 2025-04-22 DIAGNOSIS — I10 PRIMARY HYPERTENSION: Primary | Chronic | ICD-10-CM

## 2025-04-22 DIAGNOSIS — E78.2 MIXED HYPERLIPIDEMIA: ICD-10-CM

## 2025-04-22 DIAGNOSIS — R00.2 PALPITATIONS: ICD-10-CM

## 2025-04-22 PROCEDURE — 99213 OFFICE O/P EST LOW 20 MIN: CPT | Performed by: NURSE PRACTITIONER

## 2025-04-22 RX ORDER — NORETHINDRONE 0.35 MG/1
1 TABLET ORAL DAILY
COMMUNITY
Start: 2025-03-03

## 2025-04-22 NOTE — PROGRESS NOTES
..My name is Ana Aguilera and I am the Practice Manager for [facility].    I would like to thank you for being a loyal patient. If you do not mind I would like to ask you a few questions about your recent visit with us.  Please feel free to reply if you wish to provide us with feedback on your first visit with our practice.    First, could you tell me what went well with your recent visit?    Secondly, we are always looking for ways to make our patients' experiences even better.  Do you have any recommendations on ways we may improve?    Finally, overall were you satisfied with your first visit to us as a [facility] facility?    In the next few days, you will be receiving a Patient Experience Survey.      Thank you for taking the time to answer a few questions today.  I hope you have a good day.

## 2025-04-22 NOTE — ASSESSMENT & PLAN NOTE
Hypertension is stable and controlled  Continue current treatment regimen.  Blood pressure will be reassessed in 6 months.    Patient to continue amlodipine, losartan, and propranolol.

## 2025-04-22 NOTE — ASSESSMENT & PLAN NOTE
Labs 2/5/25 Showed a total cholesterol 164, Triglycerides 193, HDL 50, LDL 82.      Patient to continue her atorvastatin.

## 2025-04-22 NOTE — PROGRESS NOTES
[facility] Provider Note     Date:   2025  Name: Gricelda Suggs  :   1990  PCP: Eveline Evangelista APRN    Chief Complaint   Patient presents with    Palpitations       Subjective     History of Present Illness  Gricelda Suggs is a 35 y.o. female who presents today for follow up hypertension and palpitations.    Patient states she has been doing well.  Her palpitations have improved.  She states that her SBP is normally 120-138.  Patient states that her A1c is 7.2 and she thinks she is going to talk with her PCP about starting on a GLP1 medication.  She states she feels like it will better control her A1c as well as weight loss.    Cardiac/DOMINGO History:    Holter monitor 24 showed 1 episode of sinus tachycardia with ventricular tachycardia 8 beats at a rate of 191 bpm.  PVC burden<1%.  No critical rhythms or pauses.    ECHO 24  Left ventricular systolic function is normal. Left ventricular ejection fraction appears to be 61 - 65%.  ·  Left ventricular diastolic function was normal.  ·  No significant valvular regurgitation or stenosis present.  ·  Estimated right ventricular systolic pressure from tricuspid regurgitation is normal (<35 mmHg).    HST 24 non-diagnostic     EKG 8/3/23 sinus rhythm    Holter monitor 23 Tachycardia burden  5.32%, PVC and PAC burden <0.1%.    Co-existing, DM Type 1, hypercholesterolemia, HTN,  anemia, and Hashimoto's     Reports Denies   Chest Pain [] [x]   Shortness of Air [] [x]   Palpitations [x]Occasional []   Edema [] [x]   Dizziness [] [x]   Syncope [] [x]       No Known Allergies    Current Outpatient Medications:     amLODIPine (NORVASC) 5 MG tablet, Take 1 tablet by mouth Daily., Disp: 90 tablet, Rfl: 2    atorvastatin (LIPITOR) 10 MG tablet, Take 1 tablet by mouth Every Night., Disp: , Rfl:     busPIRone (BUSPAR) 5 MG tablet, Take 1 tablet by mouth 2 (Two) Times a Day., Disp: , Rfl:     Continuous Glucose  Sensor (Guardian 4 Glucose Sensor) misc, , Disp: , Rfl:     Glucagon (Baqsimi One Pack) 3 MG/DOSE powder, 3 mg into the nostril(s) as directed by provider Daily As Needed (severe hypoglycemia)., Disp: 1 each, Rfl: 5    glucose blood (Accu-Chek Guide) test strip, Use to test blood glucose levels once daily. Dx code e11.65, Disp: 100 each, Rfl: 4    Insulin Infusion Pump (MiniMed 780G Insulin Pump) kit, 34 units, Disp: , Rfl:     Insulin Lispro (HumaLOG) 100 UNIT/ML injection, 100 units per day via insulin pump. Ok to dispense lispro as generic, Disp: 30 mL, Rfl: 12    Jencycla 0.35 MG tablet, Take 1 tablet by mouth Daily., Disp: , Rfl:     loratadine (CLARITIN) 10 MG tablet, Take 1 tablet by mouth Daily., Disp: , Rfl:     losartan (COZAAR) 100 MG tablet, Take 1 tablet by mouth Daily., Disp: , Rfl:     propranolol (INDERAL) 40 MG tablet, Take 1 tablet by mouth 2 (Two) Times a Day., Disp: 180 tablet, Rfl: 2    sertraline (ZOLOFT) 50 MG tablet, Take 1 tablet by mouth Daily., Disp: , Rfl:     Past Medical History:   Diagnosis Date    Hashimoto's thyroiditis 2002    Hypercholesterolemia     Hypertension 9/2024    Hypoglycemia due to type 1 diabetes mellitus     Type 1 diabetes mellitus, uncontrolled     Vitamin D deficiency 09/12/22      History reviewed. No pertinent surgical history.  Family History   Problem Relation Age of Onset    Hypertension Mother         low    Thyroid disease Mother     Hyperlipidemia Mother     Diabetes Father     Hypertension Father     Cancer Father         bladder    Obesity Father     Anemia Sister      Social History     Socioeconomic History    Marital status:    Tobacco Use    Smoking status: Never     Passive exposure: Never    Smokeless tobacco: Never   Vaping Use    Vaping status: Never Used   Substance and Sexual Activity    Alcohol use: Yes     Alcohol/week: 2.0 standard drinks of alcohol     Types: 2 Glasses of wine per week     Comment: occasional    Drug use: Never     "Sexual activity: Yes     Partners: Male     Birth control/protection: Birth control pill       Objective     Vital Signs:  /80 (BP Location: Left arm, Patient Position: Sitting, Cuff Size: Large Adult)   Pulse 71   Ht 170.2 cm (67\")   Wt 113 kg (249 lb)   SpO2 97%   BMI 39.00 kg/m²   Estimated body mass index is 39 kg/m² as calculated from the following:    Height as of this encounter: 170.2 cm (67\").    Weight as of this encounter: 113 kg (249 lb).             Physical Exam  Constitutional:       Appearance: Normal appearance. She is obese.   Cardiovascular:      Rate and Rhythm: Normal rate and regular rhythm.      Pulses: Normal pulses.      Heart sounds: Normal heart sounds.   Pulmonary:      Effort: Pulmonary effort is normal.      Breath sounds: Normal breath sounds.   Musculoskeletal:         General: Normal range of motion.   Skin:     General: Skin is dry.      Capillary Refill: Capillary refill takes less than 2 seconds.   Neurological:      General: No focal deficit present.      Mental Status: She is alert and oriented to person, place, and time.   Psychiatric:         Mood and Affect: Mood normal.         Behavior: Behavior normal.         Thought Content: Thought content normal.         Judgment: Judgment normal.         The following data was reviewed by: MAURICIO Persaud on 04/22/2025:    Labs 2/5/25 Lipids, CBC, creatinine, albumin, CMP, Thyroid Panel, and A1c.          Assessment and Plan     Diagnoses and all orders for this visit:    1. Primary hypertension (Primary)  Assessment & Plan:  Hypertension is stable and controlled  Continue current treatment regimen.  Blood pressure will be reassessed in 6 months.    Patient to continue amlodipine, losartan, and propranolol.      2. Mixed hyperlipidemia  Assessment & Plan:   Labs 2/5/25 Showed a total cholesterol 164, Triglycerides 193, HDL 50, LDL 82.      Patient to continue her atorvastatin.        3. Palpitations  Assessment & " Plan:  Patient reports the palpitations have been better controlled since starting on the propranolol.          Recommendations: ER if symptoms increase, Report if any new/changing symptoms immediately, Limit salt, and Limit caffeine          Follow Up  Return in about 6 months (around 10/22/2025) for HTN/Palpitations.  Patient was given instructions and counseling regarding her condition or for health maintenance advice. Please see specific information pulled into the AVS if appropriate.

## 2025-04-23 RX ORDER — INSULIN LISPRO 100 [IU]/ML
INJECTION, SOLUTION INTRAVENOUS; SUBCUTANEOUS
Qty: 90 ML | Refills: 0 | Status: SHIPPED | OUTPATIENT
Start: 2025-04-23

## 2025-04-23 NOTE — TELEPHONE ENCOUNTER
Rx Refill Note  Requested Prescriptions     Pending Prescriptions Disp Refills    Insulin Lispro (HumaLOG) 100 UNIT/ML injection [Pharmacy Med Name: HUMALOG 100 UNIT/ML VIAL] 90 mL 0     Sig: INJECT 100 UNITS PER DAY VIA INSULIN PUMP      Last office visit with prescribing clinician: 10/30/2024     Next office visit with prescribing clinician: 5/1/2025       Trini Evans  04/23/25, 14:43 EDT

## 2025-05-01 ENCOUNTER — OFFICE VISIT (OUTPATIENT)
Age: 35
End: 2025-05-01
Payer: COMMERCIAL

## 2025-05-01 VITALS
OXYGEN SATURATION: 99 % | WEIGHT: 248 LBS | SYSTOLIC BLOOD PRESSURE: 121 MMHG | HEIGHT: 67 IN | HEART RATE: 71 BPM | DIASTOLIC BLOOD PRESSURE: 89 MMHG | BODY MASS INDEX: 38.92 KG/M2

## 2025-05-01 DIAGNOSIS — E66.812 CLASS 2 SEVERE OBESITY DUE TO EXCESS CALORIES WITH SERIOUS COMORBIDITY AND BODY MASS INDEX (BMI) OF 38.0 TO 38.9 IN ADULT: ICD-10-CM

## 2025-05-01 DIAGNOSIS — E66.01 CLASS 2 SEVERE OBESITY DUE TO EXCESS CALORIES WITH SERIOUS COMORBIDITY AND BODY MASS INDEX (BMI) OF 38.0 TO 38.9 IN ADULT: ICD-10-CM

## 2025-05-01 DIAGNOSIS — E10.65 UNCONTROLLED TYPE 1 DIABETES MELLITUS WITH HYPERGLYCEMIA: Primary | ICD-10-CM

## 2025-05-01 LAB
EXPIRATION DATE: ABNORMAL
EXPIRATION DATE: ABNORMAL
GLUCOSE BLDC GLUCOMTR-MCNC: 240 MG/DL (ref 70–130)
HBA1C MFR BLD: 7 % (ref 4.5–5.7)
Lab: ABNORMAL
Lab: ABNORMAL

## 2025-05-01 RX ORDER — SEMAGLUTIDE 0.25 MG/.5ML
0.25 INJECTION, SOLUTION SUBCUTANEOUS WEEKLY
Qty: 2 ML | Refills: 0 | Status: SHIPPED | OUTPATIENT
Start: 2025-05-01

## 2025-05-01 NOTE — PROGRESS NOTES
"     Office Note      Date: 2025  Patient Name: Gricelda Suggs  MRN: [id number]  : 1990    Chief Complaint   Patient presents with    Uncontrolled type 1 diabetes mellitus with hyperglycemia       History of Present Illness:   Gricelda Suggs is a 35 y.o. female who presents for Diabetes type 1.   Current RX insulin in medtronic pump with guardian 4     Bg checks are done:cgm  Hypoglycemia : rare       Last A1c:  Hemoglobin A1C   Date Value Ref Range Status   2025 7.0 (A) 4.5 - 5.7 % Final       Changes in health since last visit:  none . Last eye exam  upcoming appointment .    Subjective            Review of Systems:   Review of Systems   Endocrine: Negative for polydipsia and polyuria.   Neurological:  Positive for numbness.       The following portions of the patient's history were reviewed and updated as appropriate: allergies, current medications, past family history, past medical history, past social history, past surgical history, and problem list.    Objective     Visit Vitals  /89 (BP Location: Right arm, Patient Position: Sitting)   Pulse 71   Ht 170.2 cm (67\")   Wt 112 kg (248 lb)   SpO2 99%   BMI 38.84 kg/m²           Physical Exam:  Physical Exam  Vitals reviewed.   Constitutional:       Appearance: Normal appearance.   Neurological:      Mental Status: She is alert.   Psychiatric:         Mood and Affect: Mood normal.         Behavior: Behavior normal.         Thought Content: Thought content normal.         Judgment: Judgment normal.          Assessment / Plan      Assessment & Plan:  Problem List Items Addressed This Visit       Uncontrolled type 1 diabetes mellitus with hyperglycemia - Primary    Overview   Diagnosed age 2         Current Assessment & Plan    Eyes- exam pending  Feet are good  Kidneys and lipids are up to date    She probably has carpal tunnel and will discuss with her pcp          Relevant Medications    Glucagon (Baqsimi One Pack) 3 MG/DOSE powder    " HumaLOG 100 UNIT/ML injection    Other Relevant Orders    POC Glucose, Blood (Completed)    POC Glycosylated Hemoglobin (Hb A1C) (Completed)    Class 2 severe obesity due to excess calories with serious comorbidity and body mass index (BMI) of 38.0 to 38.9 in adult    Current Assessment & Plan    Would be a candidate for wegovy as she is overweight and does not have type 2 diabetes.  Side effects and precautions give          Relevant Medications    Semaglutide-Weight Management (Wegovy) 0.25 MG/0.5ML solution auto-injector         Electronically signed by : Mario Moe MD  05/01/2025

## 2025-05-01 NOTE — ASSESSMENT & PLAN NOTE
Would be a candidate for wegovy as she is overweight and does not have type 2 diabetes.  Side effects and precautions give

## 2025-05-01 NOTE — ASSESSMENT & PLAN NOTE
Eyes- exam pending  Feet are good  Kidneys and lipids are up to date    She probably has carpal tunnel and will discuss with her pcp

## 2025-05-02 ENCOUNTER — PRIOR AUTHORIZATION (OUTPATIENT)
Dept: ENDOCRINOLOGY | Facility: CLINIC | Age: 35
End: 2025-05-02
Payer: COMMERCIAL

## 2025-05-02 NOTE — TELEPHONE ENCOUNTER
SERGIO BREWSTER (Key: [id number])  Rx #: [id number]  Wegovy 0.25MG/0.5ML auto-injectors  Form  [facility] Electronic PA Form (2017 NCPDP)  Created  23 hours ago  Sent to Plan  42 minutes ago  Plan Response  42 minutes ago  Submit Clinical Questions  39 minutes ago  Determination  Favorable  39 minutes ago  Your prior authorization for Wegovy has been approved!  More Info  Personalized support and financial assistance may be available through the 's [employer] program. For more information, and to see program requirements, click on the More Info button to the right.    Message from plan: Your PA request has been approved. Additional information will be provided in the approval communication. (Message 8355). Authorization Expiration Date: October 29, 2025.

## 2025-05-07 RX ORDER — TIRZEPATIDE 2.5 MG/.5ML
2.5 INJECTION, SOLUTION SUBCUTANEOUS WEEKLY
Qty: 2 ML | Refills: 0 | Status: SHIPPED | OUTPATIENT
Start: 2025-05-07

## 2025-05-12 ENCOUNTER — PRIOR AUTHORIZATION (OUTPATIENT)
Dept: ENDOCRINOLOGY | Facility: CLINIC | Age: 35
End: 2025-05-12
Payer: COMMERCIAL

## 2025-05-12 NOTE — TELEPHONE ENCOUNTER
SERGIO NEY (Key: [id number])  Rx #: [id number]  Zepbound 2.5MG/0.5ML pen-injectors  Form  [facility] Electronic PA Form (2017 NCPDP)  Created  5 days ago  Sent to Plan  1 hour ago  Plan Response  1 hour ago  Submit Clinical Questions  1 hour ago  Determination  Favorable  1 hour ago  Message from Plan  Your PA request has been approved. Additional information will be provided in the approval communication. (Message 1143). Authorization Expiration Date: November 8, 2025.

## 2025-06-05 RX ORDER — TIRZEPATIDE 2.5 MG/.5ML
INJECTION, SOLUTION SUBCUTANEOUS
Qty: 2 ML | Refills: 0 | Status: SHIPPED | OUTPATIENT
Start: 2025-06-05

## 2025-06-05 NOTE — TELEPHONE ENCOUNTER
Rx Refill Note  Requested Prescriptions     Pending Prescriptions Disp Refills    Zepbound 2.5 MG/0.5ML solution auto-injector [Pharmacy Med Name: ZEPBOUND 2.5 MG/0.5 ML PEN] 2 mL 0     Sig: INJECT 2.5 MG UNDER THE SKIN ONCE WEEKLY      Last office visit with prescribing clinician: 5/1/2025      Next office visit with prescribing clinician: 8/22/2025       Alice Farmer MA  06/05/25, 09:00 EDT

## 2025-07-06 DIAGNOSIS — R00.2 PALPITATIONS: ICD-10-CM

## 2025-07-07 RX ORDER — TIRZEPATIDE 2.5 MG/.5ML
2.5 INJECTION, SOLUTION SUBCUTANEOUS WEEKLY
Qty: 2 ML | Refills: 1 | Status: SHIPPED | OUTPATIENT
Start: 2025-07-07

## 2025-07-07 RX ORDER — INSULIN LISPRO 100 [IU]/ML
INJECTION, SOLUTION INTRAVENOUS; SUBCUTANEOUS
Qty: 90 ML | Refills: 0 | Status: SHIPPED | OUTPATIENT
Start: 2025-07-07

## 2025-07-07 RX ORDER — PROPRANOLOL HYDROCHLORIDE 40 MG/1
40 TABLET ORAL 2 TIMES DAILY
Qty: 180 TABLET | Refills: 3 | Status: SHIPPED | OUTPATIENT
Start: 2025-07-07

## 2025-07-07 NOTE — TELEPHONE ENCOUNTER
Rx Refill Note  Requested Prescriptions     Pending Prescriptions Disp Refills    Insulin Lispro (HumaLOG) 100 UNIT/ML injection 90 mL 0     Sig: INJECT 100 UNITS PER DAY VIA INSULIN PUMP      Last office visit with prescribing clinician: 5/1/2025     Next office visit with prescribing clinician: 8/22/2025       Moon Ruiz MA  07/07/25, 12:26 EDT

## 2025-07-07 NOTE — TELEPHONE ENCOUNTER
Rx Refill Note  Requested Prescriptions     Pending Prescriptions Disp Refills    Tirzepatide-Weight Management (Zepbound) 2.5 MG/0.5ML solution auto-injector 2 mL 1     Sig: Inject 0.5 mL under the skin into the appropriate area as directed 1 (One) Time Per Week.      Last office visit with prescribing clinician: 5/1/2025     Next office visit with prescribing clinician: 7/6/2025       Moon Ruiz MA  07/07/25, 12:23 EDT

## 2025-07-08 RX ORDER — INSULIN ASPART 100 [IU]/ML
INJECTION, SOLUTION INTRAVENOUS; SUBCUTANEOUS
Qty: 90 ML | Refills: 0 | Status: SHIPPED | OUTPATIENT
Start: 2025-07-08

## 2025-07-08 NOTE — TELEPHONE ENCOUNTER
Rx Refill Note  Requested Prescriptions     Pending Prescriptions Disp Refills    Insulin Aspart (novoLOG) 100 UNIT/ML injection 90 mL 0     Sig: INJECT 100 UNITS PER DAY VIA INSULIN PUMP      Last office visit with prescribing clinician: 5/1/2025     Next office visit with prescribing clinician: 8/22/2025       Moon Ruiz MA  07/08/25, 13:26 EDT

## 2025-07-27 DIAGNOSIS — I10 PRIMARY HYPERTENSION: Chronic | ICD-10-CM

## 2025-07-28 RX ORDER — AMLODIPINE BESYLATE 5 MG/1
5 TABLET ORAL DAILY
Qty: 90 TABLET | Refills: 2 | Status: SHIPPED | OUTPATIENT
Start: 2025-07-28

## 2025-08-22 ENCOUNTER — OFFICE VISIT (OUTPATIENT)
Age: 35
End: 2025-08-22
Payer: COMMERCIAL

## 2025-08-22 VITALS
HEIGHT: 67 IN | DIASTOLIC BLOOD PRESSURE: 76 MMHG | WEIGHT: 232 LBS | BODY MASS INDEX: 36.41 KG/M2 | OXYGEN SATURATION: 97 % | SYSTOLIC BLOOD PRESSURE: 124 MMHG | HEART RATE: 76 BPM

## 2025-08-22 DIAGNOSIS — E10.65 UNCONTROLLED TYPE 1 DIABETES MELLITUS WITH HYPERGLYCEMIA: Primary | ICD-10-CM

## 2025-08-22 DIAGNOSIS — E66.812 CLASS 2 SEVERE OBESITY DUE TO EXCESS CALORIES WITH SERIOUS COMORBIDITY AND BODY MASS INDEX (BMI) OF 38.0 TO 38.9 IN ADULT: ICD-10-CM

## 2025-08-22 DIAGNOSIS — E66.01 CLASS 2 SEVERE OBESITY DUE TO EXCESS CALORIES WITH SERIOUS COMORBIDITY AND BODY MASS INDEX (BMI) OF 38.0 TO 38.9 IN ADULT: ICD-10-CM

## 2025-08-22 LAB
EGFRCR SERPLBLD CKD-EPI 2021: 116 ML/MIN/1.73 (ref 60–?)
EXPIRATION DATE: ABNORMAL
EXPIRATION DATE: ABNORMAL
GLUCOSE BLDC GLUCOMTR-MCNC: 173 MG/DL (ref 70–130)
HBA1C MFR BLD: 6.8 % (ref 4.5–5.7)
Lab: ABNORMAL
Lab: ABNORMAL
MICROALBUMIN/CREAT UR: 14 MG/G (ref 0–29)

## 2025-08-22 RX ORDER — TIRZEPATIDE 5 MG/.5ML
5 INJECTION, SOLUTION SUBCUTANEOUS WEEKLY
Qty: 2 ML | Refills: 3 | Status: SHIPPED | OUTPATIENT
Start: 2025-08-22